# Patient Record
Sex: FEMALE | Race: BLACK OR AFRICAN AMERICAN | NOT HISPANIC OR LATINO | Employment: FULL TIME | URBAN - METROPOLITAN AREA
[De-identification: names, ages, dates, MRNs, and addresses within clinical notes are randomized per-mention and may not be internally consistent; named-entity substitution may affect disease eponyms.]

---

## 2017-05-25 ENCOUNTER — OFFICE VISIT (OUTPATIENT)
Dept: FAMILY MEDICINE | Facility: CLINIC | Age: 37
End: 2017-05-25

## 2017-05-25 VITALS
SYSTOLIC BLOOD PRESSURE: 112 MMHG | TEMPERATURE: 98.2 F | DIASTOLIC BLOOD PRESSURE: 68 MMHG | BODY MASS INDEX: 20.99 KG/M2 | WEIGHT: 136 LBS | RESPIRATION RATE: 20 BRPM | OXYGEN SATURATION: 98 % | HEART RATE: 60 BPM

## 2017-05-25 DIAGNOSIS — R19.7 HEMORRHAGIC DIARRHEA: Primary | ICD-10-CM

## 2017-05-25 DIAGNOSIS — R11.2 NON-INTRACTABLE VOMITING WITH NAUSEA, UNSPECIFIED VOMITING TYPE: ICD-10-CM

## 2017-05-25 RX ORDER — ONDANSETRON 4 MG/1
4 TABLET, FILM COATED ORAL EVERY 6 HOURS PRN
Qty: 18 TABLET | Refills: 0 | Status: SHIPPED | OUTPATIENT
Start: 2017-05-25 | End: 2019-08-01

## 2017-05-25 RX ORDER — CIPROFLOXACIN 500 MG/1
500 TABLET, FILM COATED ORAL 2 TIMES DAILY
Qty: 10 TABLET | Refills: 0 | Status: SHIPPED | OUTPATIENT
Start: 2017-05-25 | End: 2017-05-25

## 2017-05-25 NOTE — PROGRESS NOTES
HPI:       Rachel Osei is a 37 year old who presents for the following  Patient presents with:  Gastrointestinal Problem: Blood in stool, sharp GI pain, chills and sweats, vomiting and nausea, diahrea ongoing over 12 hours, still having cramping with sharp pain, had recent trip to [facility] for a few days and was in [facility] and [place]       Diarrhea     How long?: 12 hours    She stated that her diarrhea stopped at 4am this morning.   However she continues to have sharp crampy abdominal pain lasting 3-4 seconds. She ate coffee, bagel and cream cheese from [employer] bread, green tea, instant brown rice bowl with pea, chips, cheese-its.   She returned from [facility] on Monday, May 22. She was there fore 3 days, during that trip she was on a cruise to [place] and [place].   No one else that she went on the trip with is sick with similar symptoms. No one at work  are sick as well. She lives alone.   Associated with vomiting, about 3 episodes of nonbloody emesis      Description:   Consistency of stool: creamy   Blood in stool:  YES   Number of loose stools in past 24 hours: 4-5    Progression of Symptoms:  improving    Accompanying Signs & Symptoms:  Fever:  YES subjective, associated with chills  Nausea or vomiting;  YES   Abdominal pain: YES   Episodes of constipation: NO     Weight loss: No    History:   Ill contacts: No   Recent use of antibiotics:  YES (she cannot recall the name of it, but one was for UTI sounds like it may have been bactrim (took it for 3 days) and the other one was for BV most likely metronidazole)    Recent travels:  YES as stated above         Recent medication-new or changes(Rx or OTC): Patient took plan B yesterday     Precipitating factors:   Nothing    Alleviating factors:   Salt water which helped.  Jaskaran    She has no significant past medical history.  She has no significant family History. No family history of IBD or celiac disease  Patient is a new patient of this clinic.  She does  not drink alcohol, never smoked. No history of drug use.   She works as a  at a Law firm           Review of Systems:   Review of Systems   Constitutional: Positive for chills and fever (subjective).   HENT: Positive for sore throat (from vomiting per patient report).    Eyes: Negative for visual disturbance.   Respiratory: Negative for cough and shortness of breath.    Cardiovascular: Negative for chest pain.   Gastrointestinal: Positive for blood in stool, diarrhea, nausea and vomiting.   Genitourinary: Negative for difficulty urinating, dysuria, vaginal bleeding and vaginal discharge.   Allergic/Immunologic: Negative.    Neurological: Negative.    Hematological: Negative.              Physical Exam:   Patient Vitals for the past 24 hrs:   BP Temp Temp src Pulse Resp SpO2 Weight   05/25/17 0934 112/68 98.2  F (36.8  C) Oral 60 20 98 % 136 lb (61.7 kg)     Body mass index is 20.99 kg/(m^2).  Vitals were reviewed and were normal     Physical Exam   Constitutional: She is oriented to person, place, and time. She appears well-developed and well-nourished. No distress.   HENT:   Head: Normocephalic and atraumatic.   Eyes: Conjunctivae are normal.   Neck: Normal range of motion.   Cardiovascular: Normal rate and regular rhythm.  Exam reveals no gallop.    No murmur heard.  Pulmonary/Chest: Effort normal and breath sounds normal. She has no wheezes. She has no rales.   Abdominal: Soft. Bowel sounds are normal. She exhibits no distension. There is tenderness (LLQ tenderness). There is no rebound and no guarding.   Musculoskeletal: She exhibits no edema (no lower extremity edema).   Neurological: She is alert and oriented to person, place, and time.   Skin: Skin is warm. No rash noted. She is not diaphoretic.   Psychiatric: She has a normal mood and affect. Her behavior is normal. Thought content normal.         Results:   Stool studies pending   Assessment and Plan   Rachel is a 37 year old female with no  significant past medical history who was seen today to establish care and gastrointestinal problem.    Diagnoses and all orders for this visit:    Hemorrhagic diarrhea  Non-intractable vomiting with nausea, unspecified vomiting type  Likely infectious pathogens with concerns for EHEC vs Shigella given history. Also with her history of recent travel amebiasis is also on differential for causative pathogens. She also has recent antibiotic use which could predispose her to C.diff diarrhea. I've ordered stool studies for further evaluation. She does not have serious illness or significant comorbidities, and her diarrhea is resolving. Thus, I will not treat empirically with antibiotics as it may precipitate HUS, if this is confirmed to be EHEC. Instead, awaiting stool studies, and will direct treatment accordingly.   Will treat symptomatically with Pepto Bismol for abdominal cramping and Zofran for nausea/vomiting. Encouraged to increase fluid intake.    Reviewed warning signs and symptoms with patient and advised to return to clinic or report to ED if symptomatic.   -     Clostridium difficile toxin B PCR; Future  -     Ova and Parasite Exam Routine; Future  -     Giardia antigen; Future  -     Enteric Bacteria and Virus Panel by NICHOL Stool; Future  -     bismuth subsalicylate (PEPTO BISMOL) 262 MG/15ML suspension; Take 15 mLs by mouth every 6 hours as needed for indigestion  -     ondansetron (ZOFRAN) 4 MG tablet; Take 1 tablet (4 mg) by mouth every 6 hours as needed for nausea        There are no discontinued medications.  Options for treatment and follow-up care were reviewed with the patient. Rachel Osei  engaged in the decision making process and verbalized understanding of the options discussed and agreed with the final plan.    Sandra Treadwell MD

## 2017-05-25 NOTE — PATIENT INSTRUCTIONS
Treating Diarrhea  Take zofran for nausea or vomiting  May use bismuth salicylate for abdominal cramping.  Please bring back stool sample for further testing  Make sure your well hydrated.   Diarrhea occurs when you have loose, watery, or frequent bowel movements. It is a common problem with many causes. Most cases of diarrhea clear up on their own. But certain cases may need treatment. Be sure to see your health care provider if your symptoms do not improve within a few days.    Getting Relief  Treatment of diarrhea depends on its cause. Diarrhea caused by bacterial or parasite infection is often treated with antibiotics. Diarrhea caused by other factors, such as a stomach virus, often improves with simple home treatment. The tips below may also help relieve your symptoms.    Drink plenty of fluids. This helps prevent too much fluid loss (dehydration). Water, clear soups, and electrolyte solutions are good choices. Avoid alcohol, coffee, tea, and milk. These can make symptoms worse.    Suck on ice chips if drinking makes you queasy.    Return to your normal diet slowly. You may want to eat bland foods at first, such as rice and toast. Also, you may need to avoid certain foods for a while, such as dairy products. These can make symptoms worse. Ask your health care provider if there are any other foods you should avoid.    If you were prescribed antibiotics, take them as directed.    Do not take anti-diarrhea medications without asking your health care provider first.  Call Your Health Care Provider If You Have:    Fever of 102 F (38.0 C) or higher    Severe pain    Worsening diarrhea or diarrhea for more than 2 days    Bloody vomit or stool    Signs of dehydration (dizziness, dry mouth and tongue, rapid pulse, dark urine)    1031-5016 [employer]. [address] [place] [address], [place] [address]. All rights reserved. This information is not intended as a substitute for professional medical care. Always  follow your healthcare professional's instructions.

## 2017-05-25 NOTE — MR AVS SNAPSHOT
After Visit Summary   5/25/2017    Rachel Osei    MRN: [id number]           Patient Information     Date Of Birth          1980        Visit Information        Provider Department      5/25/2017 9:20 AM Sandra Treadwell MD [facility]        Today's Diagnoses     Hemorrhagic diarrhea    -  1    Nausea and vomiting, intractability of vomiting not specified, unspecified vomiting type        Non-intractable vomiting with nausea, unspecified vomiting type          Care Instructions      Treating Diarrhea  Start antibiotics for course of 5 days.   Take zofran for nausea or vomiting  May use bismuth salicylate for abdominal cramping.  Please bring back stool sample for further testing  Make sure your well hydrated.   Diarrhea occurs when you have loose, watery, or frequent bowel movements. It is a common problem with many causes. Most cases of diarrhea clear up on their own. But certain cases may need treatment. Be sure to see your health care provider if your symptoms do not improve within a few days.    Getting Relief  Treatment of diarrhea depends on its cause. Diarrhea caused by bacterial or parasite infection is often treated with antibiotics. Diarrhea caused by other factors, such as a stomach virus, often improves with simple home treatment. The tips below may also help relieve your symptoms.    Drink plenty of fluids. This helps prevent too much fluid loss (dehydration). Water, clear soups, and electrolyte solutions are good choices. Avoid alcohol, coffee, tea, and milk. These can make symptoms worse.    Suck on ice chips if drinking makes you queasy.    Return to your normal diet slowly. You may want to eat bland foods at first, such as rice and toast. Also, you may need to avoid certain foods for a while, such as dairy products. These can make symptoms worse. Ask your health care provider if there are any other foods you should avoid.    If you were prescribed  antibiotics, take them as directed.    Do not take anti-diarrhea medications without asking your health care provider first.  Call Your Health Care Provider If You Have:    Fever of 102 F (38.0 C) or higher    Severe pain    Worsening diarrhea or diarrhea for more than 2 days    Bloody vomit or stool    Signs of dehydration (dizziness, dry mouth and tongue, rapid pulse, dark urine)    4662-1736 The [employer]. [address] [place] [address]. All rights reserved. This information is not intended as a substitute for professional medical care. Always follow your healthcare professional's instructions.                Follow-ups after your visit        Future tests that were ordered for you today     Open Future Orders        Priority Expected Expires Ordered    Clostridium difficile toxin B PCR Routine  6/24/2017 5/25/2017    Ova and Parasite Exam Routine Routine  6/24/2017 5/25/2017    Giardia antigen Routine  6/24/2017 5/25/2017    Enteric Bacteria and Virus Panel by NICHOL Stool Routine  6/24/2017 5/25/2017            Who to contact     Please call your clinic at [phone] to:    Ask questions about your health    Make or cancel appointments    Discuss your medicines    Learn about your test results    Speak to your doctor   If you have compliments or concerns about an experience at your clinic, or if you wish to file a complaint, please contact [facility] Patient Relations at [phone] or email us at [email or web address]         Additional Information About Your Visit        [email or web address] Information     [employer] is an electronic gateway that provides easy, online access to your medical records. With [employer], you can request a clinic appointment, read your test results, renew a prescription or communicate with your care team.     To sign up for [employer] visit the website at [email or web address]   You will be asked to enter the access code listed below,  as well as some personal information. Please follow the directions to create your username and password.     Your access code is: [email or web address]  Expires: 2017 10:12 AM     Your access code will  in 90 days. If you need help or a new code, please contact your [facility] or call [phone] for assistance.        Care EveryWhere ID     This is your Care EveryWhere ID. This could be used by other organizations to access your [facility] medical records  [id number]        Your Vitals Were     Pulse Temperature Respirations Last Period Pulse Oximetry Breastfeeding?    60 98.2  F (36.8  C) (Oral) 20 2017 98% No    BMI (Body Mass Index)                   20.99 kg/m2            Blood Pressure from Last 3 Encounters:   17 112/68   16 115/75   11 111/75    Weight from Last 3 Encounters:   17 136 lb (61.7 kg)   11 145 lb (65.8 kg)                 Today's Medication Changes          These changes are accurate as of: 17 10:12 AM.  If you have any questions, ask your nurse or doctor.               Start taking these medicines.        Dose/Directions    bismuth subsalicylate 262 MG/15ML suspension   Commonly known as:  PEPTO BISMOL   Used for:  Hemorrhagic diarrhea   Started by:  Sandra Treadwell MD        Dose:  15 mL   Take 15 mLs by mouth every 6 hours as needed for indigestion   Quantity:  840 mL   Refills:  1       ciprofloxacin 500 MG tablet   Commonly known as:  CIPRO   Used for:  Hemorrhagic diarrhea   Started by:  Sandra Treadwell MD        Dose:  500 mg   Take 1 tablet (500 mg) by mouth 2 times daily for 5 days   Quantity:  10 tablet   Refills:  0       ondansetron 4 MG tablet   Commonly known as:  ZOFRAN   Used for:  Non-intractable vomiting with nausea, unspecified vomiting type   Started by:  Sandra Treadwell MD        Dose:  4 mg   Take 1 tablet (4 mg) by mouth every 6 hours as needed for nausea   Quantity:  18 tablet    Refills:  0            Where to get your medicines      These medications were sent to [facility] - [place], [place] - [facility] [place] [address] [facility] [address]     Phone:  [phone]     bismuth subsalicylate 262 MG/15ML suspension    ciprofloxacin 500 MG tablet    ondansetron 4 MG tablet                Primary Care Provider Office Phone # Fax #    Sandra Treadwell [phone]       [facility] [address] [facility] [address]        Thank you!     Thank you for choosing [facility]  for your care. Our goal is always to provide you with excellent care. Hearing back from our patients is one way we can continue to improve our services. Please take a few minutes to complete the written survey that you may receive in the mail after your visit with us. Thank you!             Your Updated Medication List - Protect others around you: Learn how to safely use, store and throw away your medicines at www.disposemymeds.org.          This list is accurate as of: 5/25/17 10:12 AM.  Always use your most recent med list.                   Brand Name Dispense Instructions for use    bismuth subsalicylate 262 MG/15ML suspension    PEPTO BISMOL    840 mL    Take 15 mLs by mouth every 6 hours as needed for indigestion       ciprofloxacin 500 MG tablet    CIPRO    10 tablet    Take 1 tablet (500 mg) by mouth 2 times daily for 5 days       NO ACTIVE MEDICATIONS          ondansetron 4 MG tablet    ZOFRAN    18 tablet    Take 1 tablet (4 mg) by mouth every 6 hours as needed for nausea

## 2017-05-25 NOTE — PROGRESS NOTES
Preceptor Attestation:   Patient seen and discussed with the resident. Assessment and plan reviewed with resident and agreed upon.   Supervising Physician:  Mira Santiago MD  [facility]

## 2017-05-26 ASSESSMENT — ENCOUNTER SYMPTOMS
SORE THROAT: 1
DYSURIA: 0
BLOOD IN STOOL: 1
COUGH: 0
SHORTNESS OF BREATH: 0
HEMATOLOGIC/LYMPHATIC NEGATIVE: 1
FEVER: 1
NAUSEA: 1
CHILLS: 1
NEUROLOGICAL NEGATIVE: 1
ALLERGIC/IMMUNOLOGIC NEGATIVE: 1
DIARRHEA: 1
VOMITING: 1
DIFFICULTY URINATING: 0

## 2017-06-01 DIAGNOSIS — R19.7 HEMORRHAGIC DIARRHEA: ICD-10-CM

## 2017-06-02 DIAGNOSIS — R19.7 HEMORRHAGIC DIARRHEA: ICD-10-CM

## 2017-06-02 LAB
C DIFF TOX B STL QL: ABNORMAL
MICRO REPORT STATUS: NORMAL
O+P STL MICRO: NORMAL
SPECIMEN SOURCE: ABNORMAL
SPECIMEN SOURCE: NORMAL

## 2017-06-04 ENCOUNTER — TELEPHONE (OUTPATIENT)
Dept: FAMILY MEDICINE | Facility: CLINIC | Age: 37
End: 2017-06-04

## 2017-06-04 DIAGNOSIS — A04.72 C. DIFFICILE DIARRHEA: Primary | ICD-10-CM

## 2017-06-04 RX ORDER — METRONIDAZOLE 500 MG/1
500 TABLET ORAL 3 TIMES DAILY
Qty: 30 TABLET | Refills: 0 | Status: SHIPPED | OUTPATIENT
Start: 2017-06-04 | End: 2017-06-14

## 2017-06-04 NOTE — TELEPHONE ENCOUNTER
C.diff positive, prescription for flagyl sent to [facility] pharmacy.     Sandra Treadwell MD   [facility]

## 2017-06-05 LAB
CAMPYLOBACTER GROUP BY NAT: ABNORMAL
ENTERIC PATHOGEN COMMENT: ABNORMAL
G LAMBLIA AG STL QL IA: NORMAL
MICRO REPORT STATUS: NORMAL
MICRO REPORT STATUS: NORMAL
NOROVIRUS I AND II BY NAT: ABNORMAL
O+P STL MICRO: NORMAL
ROTAVIRUS A BY NAT: ABNORMAL
SALMONELLA SPECIES BY NAT: ABNORMAL
SHIGA TOXIN 1 GENE BY NAT: ABNORMAL
SHIGA TOXIN 2 GENE BY NAT: ABNORMAL
SHIGELLA SP+EIEC IPAH STL QL NAA+PROBE: ABNORMAL
SPECIMEN SOURCE: NORMAL
SPECIMEN SOURCE: NORMAL
VIBRIO GROUP BY NAT: ABNORMAL
YERSINIA ENTEROCOLITICA BY NAT: ABNORMAL

## 2017-06-05 NOTE — TELEPHONE ENCOUNTER
[facility] phone call message- patient requesting results:    Test: Lab    Date of test: 6/2/17    Additional Comments: Patient calling for additional details regarding results.    OK to leave a message on voice mail? Yes    Primary language: English      needed? No    Call taken on June 5, 2017 at 10:31 AM by Destini Blank

## 2017-06-05 NOTE — TELEPHONE ENCOUNTER
Returned PT's call. All questions answered. Pt will be starting the ABX today.     Elliott Escalona RN

## 2017-07-01 ENCOUNTER — HEALTH MAINTENANCE LETTER (OUTPATIENT)
Age: 37
End: 2017-07-01

## 2019-08-01 ENCOUNTER — HOSPITAL ENCOUNTER (EMERGENCY)
Facility: CLINIC | Age: 39
Discharge: HOME | End: 2019-08-01
Attending: EMERGENCY MEDICINE | Admitting: EMERGENCY MEDICINE
Payer: COMMERCIAL

## 2019-08-01 ENCOUNTER — APPOINTMENT (OUTPATIENT)
Dept: ULTRASOUND IMAGING | Facility: CLINIC | Age: 39
End: 2019-08-01
Attending: EMERGENCY MEDICINE
Payer: COMMERCIAL

## 2019-08-01 VITALS
HEART RATE: 83 BPM | WEIGHT: 167 LBS | RESPIRATION RATE: 16 BRPM | TEMPERATURE: 99.2 F | SYSTOLIC BLOOD PRESSURE: 126 MMHG | BODY MASS INDEX: 25.77 KG/M2 | OXYGEN SATURATION: 100 % | DIASTOLIC BLOOD PRESSURE: 72 MMHG

## 2019-08-01 DIAGNOSIS — N39.0 URINARY TRACT INFECTION WITHOUT HEMATURIA, SITE UNSPECIFIED: ICD-10-CM

## 2019-08-01 DIAGNOSIS — R10.2 SUPRAPUBIC PAIN: ICD-10-CM

## 2019-08-01 DIAGNOSIS — Z3A.01 LESS THAN 8 WEEKS GESTATION OF PREGNANCY: ICD-10-CM

## 2019-08-01 LAB
ALBUMIN SERPL-MCNC: 3.6 G/DL (ref 3.4–5)
ALBUMIN UR-MCNC: 10 MG/DL
ALP SERPL-CCNC: 75 U/L (ref 40–150)
ALT SERPL W P-5'-P-CCNC: 13 U/L (ref 0–50)
ANION GAP SERPL CALCULATED.3IONS-SCNC: 5 MMOL/L (ref 3–14)
APPEARANCE UR: CLEAR
AST SERPL W P-5'-P-CCNC: 11 U/L (ref 0–45)
B-HCG SERPL-ACNC: ABNORMAL IU/L (ref 0–5)
BACTERIA #/AREA URNS HPF: ABNORMAL /HPF
BASOPHILS # BLD AUTO: 0 10E9/L (ref 0–0.2)
BASOPHILS NFR BLD AUTO: 0.1 %
BILIRUB SERPL-MCNC: 0.2 MG/DL (ref 0.2–1.3)
BILIRUB UR QL STRIP: NEGATIVE
BUN SERPL-MCNC: 11 MG/DL (ref 7–30)
CALCIUM SERPL-MCNC: 9.1 MG/DL (ref 8.5–10.1)
CHLORIDE SERPL-SCNC: 107 MMOL/L (ref 94–109)
CO2 SERPL-SCNC: 25 MMOL/L (ref 20–32)
COLOR UR AUTO: YELLOW
CREAT SERPL-MCNC: 0.99 MG/DL (ref 0.52–1.04)
DIFFERENTIAL METHOD BLD: NORMAL
EOSINOPHIL # BLD AUTO: 0 10E9/L (ref 0–0.7)
EOSINOPHIL NFR BLD AUTO: 0.4 %
ERYTHROCYTE [DISTWIDTH] IN BLOOD BY AUTOMATED COUNT: 15 % (ref 10–15)
GFR SERPL CREATININE-BSD FRML MDRD: 71 ML/MIN/{1.73_M2}
GLUCOSE SERPL-MCNC: 77 MG/DL (ref 70–99)
GLUCOSE UR STRIP-MCNC: NEGATIVE MG/DL
HCT VFR BLD AUTO: 36.4 % (ref 35–47)
HGB BLD-MCNC: 12 G/DL (ref 11.7–15.7)
HGB UR QL STRIP: NEGATIVE
IMM GRANULOCYTES # BLD: 0 10E9/L (ref 0–0.4)
IMM GRANULOCYTES NFR BLD: 0.2 %
KETONES UR STRIP-MCNC: NEGATIVE MG/DL
LEUKOCYTE ESTERASE UR QL STRIP: ABNORMAL
LYMPHOCYTES # BLD AUTO: 2.6 10E9/L (ref 0.8–5.3)
LYMPHOCYTES NFR BLD AUTO: 30.4 %
MCH RBC QN AUTO: 27.3 PG (ref 26.5–33)
MCHC RBC AUTO-ENTMCNC: 33 G/DL (ref 31.5–36.5)
MCV RBC AUTO: 83 FL (ref 78–100)
MONOCYTES # BLD AUTO: 0.5 10E9/L (ref 0–1.3)
MONOCYTES NFR BLD AUTO: 5.4 %
MUCOUS THREADS #/AREA URNS LPF: PRESENT /LPF
NEUTROPHILS # BLD AUTO: 5.3 10E9/L (ref 1.6–8.3)
NEUTROPHILS NFR BLD AUTO: 63.5 %
NITRATE UR QL: NEGATIVE
NRBC # BLD AUTO: 0 10*3/UL
NRBC BLD AUTO-RTO: 0 /100
PH UR STRIP: 5.5 PH (ref 5–7)
PLATELET # BLD AUTO: 255 10E9/L (ref 150–450)
POTASSIUM SERPL-SCNC: 3.7 MMOL/L (ref 3.4–5.3)
PROT SERPL-MCNC: 8.3 G/DL (ref 6.8–8.8)
RBC # BLD AUTO: 4.4 10E12/L (ref 3.8–5.2)
RBC #/AREA URNS AUTO: 1 /HPF (ref 0–2)
SODIUM SERPL-SCNC: 137 MMOL/L (ref 133–144)
SOURCE: ABNORMAL
SP GR UR STRIP: 1.02 (ref 1–1.03)
SQUAMOUS #/AREA URNS AUTO: 4 /HPF (ref 0–1)
UROBILINOGEN UR STRIP-MCNC: NORMAL MG/DL (ref 0–2)
WBC # BLD AUTO: 8.4 10E9/L (ref 4–11)
WBC #/AREA URNS AUTO: 6 /HPF (ref 0–5)

## 2019-08-01 PROCEDURE — 99284 EMERGENCY DEPT VISIT MOD MDM: CPT | Mod: Z6 | Performed by: EMERGENCY MEDICINE

## 2019-08-01 PROCEDURE — 80053 COMPREHEN METABOLIC PANEL: CPT | Performed by: EMERGENCY MEDICINE

## 2019-08-01 PROCEDURE — 76801 OB US < 14 WKS SINGLE FETUS: CPT

## 2019-08-01 PROCEDURE — 84702 CHORIONIC GONADOTROPIN TEST: CPT | Performed by: EMERGENCY MEDICINE

## 2019-08-01 PROCEDURE — 99284 EMERGENCY DEPT VISIT MOD MDM: CPT | Mod: 25 | Performed by: EMERGENCY MEDICINE

## 2019-08-01 PROCEDURE — 81001 URINALYSIS AUTO W/SCOPE: CPT | Performed by: EMERGENCY MEDICINE

## 2019-08-01 PROCEDURE — 85025 COMPLETE CBC W/AUTO DIFF WBC: CPT | Performed by: EMERGENCY MEDICINE

## 2019-08-01 RX ORDER — CEPHALEXIN 500 MG/1
500 CAPSULE ORAL 4 TIMES DAILY
Qty: 28 CAPSULE | Refills: 0 | Status: SHIPPED | OUTPATIENT
Start: 2019-08-01 | End: 2019-08-08

## 2019-08-01 ASSESSMENT — ENCOUNTER SYMPTOMS
ARTHRALGIAS: 0
SHORTNESS OF BREATH: 0
COLOR CHANGE: 0
EYE REDNESS: 0
ABDOMINAL PAIN: 1
FEVER: 0
FREQUENCY: 1
HEMATURIA: 0
CONFUSION: 0
DYSURIA: 0
NECK STIFFNESS: 0
DIZZINESS: 1
HEADACHES: 0
DIFFICULTY URINATING: 0
BACK PAIN: 1

## 2019-08-01 NOTE — ED PROVIDER NOTES
[facility] EMERGENCY DEPARTMENT ([facility])    19       History     Chief Complaint   Patient presents with     Abdominal Pain     abdominal cramping and in the lower back for about a week, was at urgent care- pregnancy test positive. OBGYN recommended pt to come to the ED.     The history is provided by the patient and medical records.     Rachel Osei is a 39 year old , otherwise healthy female who presents to the Emergency Department for evaluation of lower abdominal cramping and lower back pain in the setting of a positive pregnancy test today.  The patient reports that for the past week she has been having some lower abdominal cramping; however, she did not think anything of this as it felt like her normal cramping associated with her menstrual periods, and she was supposed to start her menstrual period last week.  Patient's last mental period was at the end of .  The patient reports that she has also been having some lower back pain for the past week, but she did not think anything of this either as she typically has this pain with stress, and she reports that she has had increased stress in her life for the last few weeks.  The patient reports that she did not start her menstrual period today and she became concerned, she did a home pregnancy test and this was positive.  She then went to urgent care and had a second positive pregnancy test.  The patient was advised to follow-up with OB for further pregnancy care.    The patient contacted the OB clinic and she was being asked some routine questions.  The patient did tell them that she passed a small amount of white tissue yesterday, and with her abdominal cramping, the OB wanted the patient to come to the Emergency Department to be evaluated.  The patient presents here now.    The patient here reports that she has had some intermittent, mild dizziness this past week, but she denies any syncopal episodes.  She also notes that  she has had some mild urinary frequency this week, but she denies any other urinary symptoms.  She reports that she has otherwise been feeling at her baseline of health and she reports that she is otherwise healthy.  She denies being on any daily medications.  The patient is not on any birth control currently.  No other symptoms noted.    I have reviewed the Medications, Allergies, Past Medical and Surgical History, and Social History in the Epic system.    No past medical history on file.    No past surgical history on file.    No family history on file.    Social History     Tobacco Use     Smoking status: Never Smoker     Smokeless tobacco: Never Used   Substance Use Topics     Alcohol use: No       No current facility-administered medications for this encounter.      Current Outpatient Medications   Medication     bismuth subsalicylate (PEPTO BISMOL) 262 MG/15ML suspension     NO ACTIVE MEDICATIONS     ondansetron (ZOFRAN) 4 MG tablet      No Known Allergies      Review of Systems   Constitutional: Negative for fever.   HENT: Negative for congestion.    Eyes: Negative for redness.   Respiratory: Negative for shortness of breath.    Cardiovascular: Negative for chest pain.   Gastrointestinal: Positive for abdominal pain.   Genitourinary: Positive for frequency. Negative for decreased urine volume, difficulty urinating, dysuria, hematuria, urgency and vaginal bleeding.   Musculoskeletal: Positive for back pain. Negative for arthralgias and neck stiffness.   Skin: Negative for color change.   Neurological: Positive for dizziness. Negative for syncope and headaches.   Psychiatric/Behavioral: Negative for confusion.   All other systems reviewed and are negative.      Physical Exam   BP: 125/72  Pulse: 83  Temp: 99.2  F (37.3  C)  Resp: 16  Weight: 75.8 kg (167 lb)  SpO2: 100 %      Physical Exam   Constitutional: She is oriented to person, place, and time. She appears well-developed and well-nourished. No distress.    HENT:   Head: Normocephalic and atraumatic.   Mouth/Throat: No oropharyngeal exudate.   Eyes: Pupils are equal, round, and reactive to light. Right eye exhibits no discharge. Left eye exhibits no discharge. No scleral icterus.   Neck: Normal range of motion. Neck supple.   Cardiovascular: Normal rate, regular rhythm and intact distal pulses. Exam reveals no gallop and no friction rub.   Murmur heard.  Pulmonary/Chest: Effort normal and breath sounds normal. No respiratory distress. She has no wheezes. She exhibits no tenderness.   Abdominal: Soft. Bowel sounds are normal. She exhibits no distension. There is tenderness (Minimal) in the suprapubic area.   Musculoskeletal: Normal range of motion. She exhibits no edema, tenderness or deformity.   Neurological: She is alert and oriented to person, place, and time. No cranial nerve deficit.   Skin: Skin is warm and dry. No rash noted. She is not diaphoretic. No erythema. No pallor.   Psychiatric: She has a normal mood and affect.   Nursing note and vitals reviewed.      ED Course   6:50 PM  The patient was seen and examined by Simon Sanchez DO in Room ED20.        Procedures             Critical Care time:  none             Labs Ordered and Resulted from Time of ED Arrival Up to the Time of Departure from the ED - No data to display         Assessments & Plan (with Medical Decision Making)   Is a 39-year-old female with lower abdominal cramping and back pain with a positive pregnancy test.  LMP was the end of June, 5 weeks ago.  Patient took a printed test that she found to be positive.  Patient states she believes she did pass a small amount of tissue but has otherwise not any vaginal bleeding or discharge.  She notes pelvic cramping that is similar to those that she has with her normal menses.  On exam she has some minor suprapubic tenderness.  Lab work shows an hCG of 12,555.  UA shows moderate leukocyte esterase with some WBCs and bacteria.  Ultrasound shows what  could be an early gestational sac at approximately 5 weeks 6 days.  I discussed results with patient and that it is very early and patient will need further follow-up.  With her current cramping there also could be the possibility of miscarriage.  As there a possible urinary tract infection we will prescribe a course of antibiotics.  We will provide follow-up referral with OB/GYN. Will discharge home with return precautions. Discussed reasons to return to the emergency department.  Patient understands and agrees with this plan.    I have reviewed the nursing notes.    I have reviewed the findings, diagnosis, plan and need for follow up with the patient.       Medication List      There are no discharge medications for this visit.         Final diagnoses:   None     Samy GOINS, am serving as a trained medical scribe to document services personally performed by Simon Sanchez DO, based on the provider's statements to me.   Simon GOINS DO, was physically present and have reviewed and verified the accuracy of this note documented by Samy Sanchez.    8/1/2019   [facility] [place], EMERGENCY DEPARTMENT     Simon Sanchez DO  08/07/19 0147

## 2019-08-01 NOTE — ED AVS SNAPSHOT
[facility], [place], Emergency Department  [address]  [facility] [address]  Phone:  [phone]  Fax:  [phone]                                    Rachel Osei   MRN: [id number]    Department:  [facility], Emergency Department   Date of Visit:  8/1/2019           After Visit Summary Signature Page    I have received my discharge instructions, and my questions have been answered. I have discussed any challenges I see with this plan with the nurse or doctor.    ..........................................................................................................................................  Patient/Patient Representative Signature      ..........................................................................................................................................  Patient Representative Print Name and Relationship to Patient    ..................................................               ................................................  Date                                   Time    ..........................................................................................................................................  Reviewed by Signature/Title    ...................................................              ..............................................  Date                                               Time          22EPIC Rev 08/18

## 2019-08-02 NOTE — DISCHARGE INSTRUCTIONS
Please make an appointment to follow up with OB/Gyn--[facility] (phone: [phone]) as soon as possible to establish care.    Return to the emergency department if symptoms continue, get worse, there are any new symptoms or any cause for concern.

## 2019-08-06 ENCOUNTER — TELEPHONE (OUTPATIENT)
Dept: CARE COORDINATION | Facility: CLINIC | Age: 39
End: 2019-08-06

## 2019-08-07 NOTE — TELEPHONE ENCOUNTER
Emergency Department     Reason for Intervention: Per Lyudmila, pt was in the ED on 8/1/2019 and discharged from ED. After ED visit, pt's ultrasound results came back and pt needs close follow-up as soon as possible. Lyudmila has attempted multiple times to call pt; no answer from pt and not able to leave voice message as voice mail has no identifying information.     Data: On 8/1/2019, pt came into ED. Rachel is a 39-year-old female with lower abdominal cramping and back pain with a positive pregnancy test. On exam she has some minor suprapubic tenderness.  Lab work shows an hCG of 12,555.  UA shows moderate leukocyte esterase with some WBCs and bacteria.  Ultrasound shows what could be an early gestational sac at approximately 5 weeks 6 days.     Intervention: Lyudmila asked SW to assist in getting hold of patient or next of kin, if pt does not answer, as pt needs close follow-up as soon as possible. SW called pt (P: [phone]) and pt did not answer; unable to leave voice message as voice mail has no identifying information.     SW called pt's next of kin listed on facesheet, Griffin Osei (P: [phone]). Male answered phone and said Brit Osei will be home in one hour and to call back. SW did not state any identifying information to individual who answered. SW will call back at 8:00pm.     Lyudmila paged pt's PCP-Rakan Juarez to provide update and see if PCP can assist on getting in touch with pt.     ADDENDUM 2030 - CAPRI called pt's next of kin, Griffin Osei (P: [phone]), at 8:00pm; no answer. SW attempted again at 8:30pm; no answer. SW called pt (P: [phone]) one more time and pt answered. Lyudmila was able to talk with pt and provide update. Lyudmila also notified pt's PCP Clinic.    Follow-up Required: None as pt answered and spoke with Dr. Sanchez at 8:30pm on 8/6/2019; Dr. Sanchez provided update on test results and encouraged pt to seek follow-up care.    Shahana Barroso  MASON, [facility]  Covering ED   Pager: [phone]

## 2019-08-07 NOTE — ED NOTES
I contacted the patient via phone and discussed all findings on the final read of the Ultrasound that was performed on 8-2-19. Several attempts had been previously made to contact patient. Discussed the bilobed appearance of the L ovary with multiple cysts and the importance of follow-up with ultrasound. I discussed all incidental findings.  Patient understands and agrees with this plan.     Simon Sanchez,   08/07/19 0144

## 2019-12-24 ENCOUNTER — HOSPITAL ENCOUNTER (EMERGENCY)
Facility: CLINIC | Age: 39
Discharge: HOME | End: 2019-12-24
Attending: EMERGENCY MEDICINE | Admitting: EMERGENCY MEDICINE
Payer: COMMERCIAL

## 2019-12-24 VITALS
OXYGEN SATURATION: 100 % | HEART RATE: 80 BPM | RESPIRATION RATE: 18 BRPM | HEIGHT: 67 IN | BODY MASS INDEX: 27.94 KG/M2 | DIASTOLIC BLOOD PRESSURE: 78 MMHG | SYSTOLIC BLOOD PRESSURE: 110 MMHG | TEMPERATURE: 96.5 F | WEIGHT: 178 LBS

## 2019-12-24 DIAGNOSIS — F43.29 STRESS AND ADJUSTMENT REACTION: ICD-10-CM

## 2019-12-24 LAB
ALBUMIN UR-MCNC: 30 MG/DL
APPEARANCE UR: ABNORMAL
BACTERIA #/AREA URNS HPF: ABNORMAL /HPF
BILIRUB UR QL STRIP: NEGATIVE
COLOR UR AUTO: YELLOW
GLUCOSE UR STRIP-MCNC: NEGATIVE MG/DL
HGB UR QL STRIP: NEGATIVE
KETONES UR STRIP-MCNC: 5 MG/DL
LEUKOCYTE ESTERASE UR QL STRIP: ABNORMAL
MUCOUS THREADS #/AREA URNS LPF: PRESENT /LPF
NITRATE UR QL: NEGATIVE
PH UR STRIP: 6 PH (ref 5–7)
RBC #/AREA URNS AUTO: 3 /HPF (ref 0–2)
SOURCE: ABNORMAL
SP GR UR STRIP: 1.03 (ref 1–1.03)
SQUAMOUS #/AREA URNS AUTO: 19 /HPF (ref 0–1)
UROBILINOGEN UR STRIP-MCNC: NORMAL MG/DL (ref 0–2)
WBC #/AREA URNS AUTO: 39 /HPF (ref 0–5)

## 2019-12-24 PROCEDURE — 99283 EMERGENCY DEPT VISIT LOW MDM: CPT | Mod: 25 | Performed by: EMERGENCY MEDICINE

## 2019-12-24 PROCEDURE — 81001 URINALYSIS AUTO W/SCOPE: CPT | Performed by: EMERGENCY MEDICINE

## 2019-12-24 PROCEDURE — 93010 ELECTROCARDIOGRAM REPORT: CPT | Mod: Z6 | Performed by: EMERGENCY MEDICINE

## 2019-12-24 PROCEDURE — 93005 ELECTROCARDIOGRAM TRACING: CPT | Performed by: EMERGENCY MEDICINE

## 2019-12-24 PROCEDURE — 99284 EMERGENCY DEPT VISIT MOD MDM: CPT | Performed by: EMERGENCY MEDICINE

## 2019-12-24 RX ORDER — PNV NO.95/FERROUS FUM/FOLIC AC 28MG-0.8MG
TABLET ORAL
COMMUNITY
Start: 2019-08-07 | End: 2022-07-17

## 2019-12-24 ASSESSMENT — ENCOUNTER SYMPTOMS
DYSURIA: 0
CONFUSION: 0
NERVOUS/ANXIOUS: 1
COLOR CHANGE: 0
FEVER: 0
EYE REDNESS: 0
HEADACHES: 0
FREQUENCY: 0
SHORTNESS OF BREATH: 1
DIFFICULTY URINATING: 0
DYSPHORIC MOOD: 0
NECK STIFFNESS: 0
ABDOMINAL PAIN: 0
ARTHRALGIAS: 0

## 2019-12-24 ASSESSMENT — MIFFLIN-ST. JEOR: SCORE: 1505.03

## 2019-12-24 NOTE — ED AVS SNAPSHOT
[facility], [place], Emergency Department  [address]  [facility] [address]  Phone:  [phone]  Fax:  [phone]                                    Rachel Osei   MRN: [id number]    Department:  [facility], Emergency Department   Date of Visit:  12/24/2019           After Visit Summary Signature Page    I have received my discharge instructions, and my questions have been answered. I have discussed any challenges I see with this plan with the nurse or doctor.    ..........................................................................................................................................  Patient/Patient Representative Signature      ..........................................................................................................................................  Patient Representative Print Name and Relationship to Patient    ..................................................               ................................................  Date                                   Time    ..........................................................................................................................................  Reviewed by Signature/Title    ...................................................              ..............................................  Date                                               Time          22EPIC Rev 08/18

## 2019-12-25 LAB — INTERPRETATION ECG - MUSE: NORMAL

## 2019-12-25 NOTE — ED PROVIDER NOTES
"    [facility] EMERGENCY DEPARTMENT ([facility])    12/24/19       History     Chief Complaint   Patient presents with     Anxiety     26 weeks pregnant, increasing stress, some shortness of breath but no chest pain     HPI  Rachel Osei is a 39 year old female who 26 weeks pregnant and presents to the Emergency Department with ***.    I have reviewed the Medications, Allergies, Past Medical and Surgical History, and Social History in the Epic system.    History reviewed. No pertinent past medical history.    History reviewed. No pertinent surgical history.    History reviewed. No pertinent family history.    Social History     Tobacco Use     Smoking status: Never Smoker     Smokeless tobacco: Never Used   Substance Use Topics     Alcohol use: No       No current facility-administered medications for this encounter.      Current Outpatient Medications   Medication     Prenatal Vit-Fe Fumarate-FA (PRENATAL VITAMINS) 28-0.8 MG TABS     NO ACTIVE MEDICATIONS      No Known Allergies     Review of Systems   Constitutional: Negative for fever.   HENT: Negative for congestion.    Eyes: Negative for redness.   Respiratory: Positive for shortness of breath.    Cardiovascular: Negative for chest pain.   Gastrointestinal: Negative for abdominal pain.   Genitourinary: Negative for difficulty urinating.   Musculoskeletal: Negative for arthralgias and neck stiffness.   Skin: Negative for color change.   Neurological: Negative for headaches.   Psychiatric/Behavioral: Negative for confusion. The patient is nervous/anxious.    All other systems reviewed and are negative.      Physical Exam   BP: 109/71  Pulse: 80  Temp: 96.5  F (35.8  C)  Resp: 18  Height: 170.2 cm (5' 7\")  Weight: 80.7 kg (178 lb)  SpO2: 98 %      Physical Exam    ED Course        Procedures        {EKG done?:350299::\" \"}    Critical Care time:  {none or minutes:961365::\"none\"}  {trauma activation or Fall?:029587::\" \"}  {Sepsis/Septic " "Shock/Stemi/Stroke:235329::\" \"}       No results found for this or any previous visit (from the past 24 hour(s)).      Labs Ordered and Resulted from Time of ED Arrival Up to the Time of Departure from the ED - No data to display         Assessments & Plan (with Medical Decision Making)   ***    I have reviewed the nursing notes.    I have reviewed the findings, diagnosis, plan and need for follow up with the patient.    New Prescriptions    No medications on file       Final diagnoses:   None       12/24/2019   [facility], EMERGENCY DEPARTMENT  "

## 2019-12-25 NOTE — ED PROVIDER NOTES
"  History     Chief Complaint   Patient presents with     Anxiety     26 weeks pregnant, increasing stress, some shortness of breath but no chest pain     HPI  Rachel Osei is a 39 year old female who is 26 weeks pregnant and presents to the emergency department for evaluation of anxiety and insomnia.  Patient states that she is under a great deal of stress.  She states that she has had periods where she feels anxious.  When she begins to feel anxious, patient states he also feels dyspneic.  She states that she typically is able to calm herself down but today she felt more anxious than usual.  She denies any associated chest pain.  No cough.  She denies any leg pain or swelling.  No hemoptysis.  Patient denies any abdominal pain.  No cramping.  No vaginal bleeding or gush of fluid.  She denies any dysuria, urgency, or frequency.  She has had previous episodes during this pregnancy as well.  She did have a 20-week ultrasound which appeared normal.    I have reviewed the Medications, Allergies, Past Medical and Surgical History, and Social History in the Epic system.    Review of Systems   Constitutional: Negative for fever.   HENT: Negative for congestion.    Eyes: Negative for redness.   Respiratory: Positive for shortness of breath.    Cardiovascular: Negative for chest pain.   Gastrointestinal: Negative for abdominal pain.   Genitourinary: Negative for difficulty urinating, dysuria, frequency and urgency.   Musculoskeletal: Negative for arthralgias and neck stiffness.   Skin: Negative for color change.   Neurological: Negative for headaches.   Psychiatric/Behavioral: Negative for confusion, dysphoric mood and suicidal ideas. The patient is nervous/anxious.    All other systems reviewed and are negative.      Physical Exam   BP: 109/71  Pulse: 80  Temp: 96.5  F (35.8  C)  Resp: 18  Height: 170.2 cm (5' 7\")  Weight: 80.7 kg (178 lb)  SpO2: 98 %      Physical Exam  Vitals signs and nursing note reviewed. "   Constitutional:       General: She is not in acute distress.     Appearance: Normal appearance. She is not diaphoretic.   HENT:      Head: Normocephalic and atraumatic.      Right Ear: Tympanic membrane normal.      Left Ear: Tympanic membrane normal.      Mouth/Throat:      Mouth: Mucous membranes are moist.      Pharynx: No oropharyngeal exudate.   Eyes:      General: No scleral icterus.     Pupils: Pupils are equal, round, and reactive to light.   Neck:      Musculoskeletal: Normal range of motion.   Cardiovascular:      Rate and Rhythm: Normal rate and regular rhythm.      Pulses: Normal pulses.      Heart sounds: Normal heart sounds.   Pulmonary:      Effort: Pulmonary effort is normal. No respiratory distress.      Breath sounds: Normal breath sounds.   Abdominal:      General: Bowel sounds are normal.      Palpations: Abdomen is soft.      Tenderness: There is no abdominal tenderness.      Comments: Gravid uterus   Musculoskeletal: Normal range of motion.         General: No swelling or tenderness.      Right lower leg: No edema.      Left lower leg: No edema.   Skin:     General: Skin is warm and dry.      Findings: No rash.   Neurological:      General: No focal deficit present.      Mental Status: She is alert.      Gait: Gait normal.         ED Course        Procedures             EKG Interpretation:      Interpreted by SELAM HERNÁNDEZ MD, MD  Time reviewed: 2230  Symptoms at time of EKG: none   Rhythm: normal sinus   Rate: 64  Axis: normal  Ectopy: none  Conduction: normal  ST Segments/ T Waves: No ST-T wave changes  Q Waves: none  Comparison to prior: Unchanged    Clinical Impression: normal EKG    Results for orders placed or performed during the hospital encounter of 12/24/19   UA with Microscopic     Status: Abnormal   Result Value Ref Range    Color Urine Yellow     Appearance Urine Slightly Cloudy     Glucose Urine Negative NEG^Negative mg/dL    Bilirubin Urine Negative NEG^Negative    Ketones Urine  5 (A) NEG^Negative mg/dL    Specific Gravity Urine 1.029 1.003 - 1.035    Blood Urine Negative NEG^Negative    pH Urine 6.0 5.0 - 7.0 pH    Protein Albumin Urine 30 (A) NEG^Negative mg/dL    Urobilinogen mg/dL Normal 0.0 - 2.0 mg/dL    Nitrite Urine Negative NEG^Negative    Leukocyte Esterase Urine Large (A) NEG^Negative    Source Midstream Urine     WBC Urine 39 (H) 0 - 5 /HPF    RBC Urine 3 (H) 0 - 2 /HPF    Bacteria Urine Few (A) NEG^Negative /HPF    Squamous Epithelial /HPF Urine 19 (H) 0 - 1 /HPF    Mucous Urine Present (A) NEG^Negative /LPF           Critical Care time:      Labs Ordered and Resulted from Time of ED Arrival Up to the Time of Departure from the ED   ROUTINE UA WITH MICROSCOPIC - Abnormal; Notable for the following components:       Result Value    Ketones Urine 5 (*)     Protein Albumin Urine 30 (*)     Leukocyte Esterase Urine Large (*)     WBC Urine 39 (*)     RBC Urine 3 (*)     Bacteria Urine Few (*)     Squamous Epithelial /HPF Urine 19 (*)     Mucous Urine Present (*)     All other components within normal limits            Assessments & Plan (with Medical Decision Making)   39 year old female who is 26 weeks pregnant to the emergency department complaining of anxiety and likely associated dyspnea.  She reports a great deal of stress related to work and her pregnancy.  She states that this is her first pregnancy.  She also has 2 additional children here with her that she is caring for.  The patient has normal vital signs.  She appears clinically well.  Her lungs are clear and her oxygen saturations are normal.  She does not have any signs or symptoms concerning for DVT.  I have low clinical suspicion for pulmonary embolism.  The patient's EKG is normal.  Her urine specimen appears contaminated and she does not have UTI symptoms so will not pursue further as she does not have any signs or symptoms concerning for infection such as fever.  Patient was offered further testing with chest  radiograph and labs which she declined.  She is feeling well and asymptomatic at the time of discharge.  OB clinic follow-up per routine.    I have reviewed the nursing notes.    I have reviewed the findings, diagnosis, plan and need for follow up with the patient.    Discharge Medication List as of 12/24/2019 11:19 PM          Final diagnoses:   Stress and adjustment reaction       12/24/2019   [facility], EMERGENCY DEPARTMENT     Micheal Vaca MD  12/24/19 3797

## 2019-12-25 NOTE — DISCHARGE INSTRUCTIONS
Try to maintain a regular schedule and sleep pattern.  Drink plenty of fluids to maintain your hydration.  Follow-up with your OB doctor as planned.    Return to the emergency department if you have chest pain, fever, cough, worsening symptoms, or other concerns.

## 2020-02-08 ENCOUNTER — HEALTH MAINTENANCE LETTER (OUTPATIENT)
Age: 40
End: 2020-02-08

## 2020-05-02 ENCOUNTER — APPOINTMENT (OUTPATIENT)
Dept: CT IMAGING | Facility: CLINIC | Age: 40
End: 2020-05-02
Attending: EMERGENCY MEDICINE
Payer: COMMERCIAL

## 2020-05-02 ENCOUNTER — HOSPITAL ENCOUNTER (EMERGENCY)
Facility: CLINIC | Age: 40
Discharge: HOME | End: 2020-05-02
Attending: EMERGENCY MEDICINE | Admitting: EMERGENCY MEDICINE
Payer: COMMERCIAL

## 2020-05-02 VITALS
HEIGHT: 67 IN | HEART RATE: 67 BPM | SYSTOLIC BLOOD PRESSURE: 120 MMHG | WEIGHT: 169 LBS | RESPIRATION RATE: 16 BRPM | DIASTOLIC BLOOD PRESSURE: 80 MMHG | TEMPERATURE: 98.2 F | BODY MASS INDEX: 26.53 KG/M2 | OXYGEN SATURATION: 100 %

## 2020-05-02 DIAGNOSIS — S09.90XA INJURY OF HEAD, INITIAL ENCOUNTER: ICD-10-CM

## 2020-05-02 PROCEDURE — 99284 EMERGENCY DEPT VISIT MOD MDM: CPT | Mod: 25 | Performed by: EMERGENCY MEDICINE

## 2020-05-02 PROCEDURE — 70450 CT HEAD/BRAIN W/O DYE: CPT

## 2020-05-02 PROCEDURE — 99284 EMERGENCY DEPT VISIT MOD MDM: CPT | Mod: Z6 | Performed by: EMERGENCY MEDICINE

## 2020-05-02 PROCEDURE — 25000132 ZZH RX MED GY IP 250 OP 250 PS 637: Performed by: EMERGENCY MEDICINE

## 2020-05-02 RX ORDER — IBUPROFEN 600 MG/1
600 TABLET, FILM COATED ORAL ONCE
Status: COMPLETED | OUTPATIENT
Start: 2020-05-02 | End: 2020-05-02

## 2020-05-02 RX ORDER — ACETAMINOPHEN 500 MG
1000 TABLET ORAL ONCE
Status: COMPLETED | OUTPATIENT
Start: 2020-05-02 | End: 2020-05-02

## 2020-05-02 RX ADMIN — IBUPROFEN 600 MG: 600 TABLET ORAL at 15:13

## 2020-05-02 RX ADMIN — ACETAMINOPHEN 1000 MG: 500 TABLET, FILM COATED ORAL at 15:13

## 2020-05-02 ASSESSMENT — MIFFLIN-ST. JEOR: SCORE: 1459.21
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
## 2020-05-02 NOTE — ED AVS SNAPSHOT
[facility], [place], Emergency Department  [address] [address]  Phone:  [phone]                                    Rachel Osei   MRN: [id number]    Department:  [facility], Emergency Department   Date of Visit:  5/2/2020           After Visit Summary Signature Page    I have received my discharge instructions, and my questions have been answered. I have discussed any challenges I see with this plan with the nurse or doctor.    ..........................................................................................................................................  Patient/Patient Representative Signature      ..........................................................................................................................................  Patient Representative Print Name and Relationship to Patient    ..................................................               ................................................  Date                                   Time    ..........................................................................................................................................  Reviewed by Signature/Title    ...................................................              ..............................................  Date                                               Time          22EPIC Rev 08/18

## 2020-05-04 ASSESSMENT — ENCOUNTER SYMPTOMS
NECK PAIN: 0
BRUISES/BLEEDS EASILY: 0
ROS GI COMMENTS: NO INCONTINENCE
FATIGUE: 0
NAUSEA: 0
FEVER: 0
SPEECH DIFFICULTY: 0
BACK PAIN: 1
WEAKNESS: 0
SHORTNESS OF BREATH: 0
EYE PAIN: 0
COUGH: 0
RHINORRHEA: 0
ARTHRALGIAS: 0
VOMITING: 0
NECK STIFFNESS: 0
PALPITATIONS: 0
WOUND: 1
SEIZURES: 0
TROUBLE SWALLOWING: 0
NUMBNESS: 0
TREMORS: 0
JOINT SWELLING: 0
ABDOMINAL PAIN: 0
COLOR CHANGE: 0
SORE THROAT: 0
LIGHT-HEADEDNESS: 0

## 2020-05-04 ASSESSMENT — VISUAL ACUITY: OU: 1
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
## 2020-05-08 ENCOUNTER — VIRTUAL VISIT (OUTPATIENT)
Dept: PHYSICAL MEDICINE AND REHAB | Facility: CLINIC | Age: 40
End: 2020-05-08
Payer: COMMERCIAL

## 2020-05-08 DIAGNOSIS — G44.309 POST-TRAUMATIC HEADACHE, NOT INTRACTABLE, UNSPECIFIED CHRONICITY PATTERN: ICD-10-CM

## 2020-05-08 DIAGNOSIS — M54.81 OCCIPITAL NEURALGIA OF LEFT SIDE: ICD-10-CM

## 2020-05-08 DIAGNOSIS — S06.0X1A CONCUSSION WITH LOSS OF CONSCIOUSNESS OF 30 MINUTES OR LESS, INITIAL ENCOUNTER: Primary | ICD-10-CM

## 2020-05-08 DIAGNOSIS — S16.1XXA CERVICAL MYOFASCIAL STRAIN, INITIAL ENCOUNTER: ICD-10-CM

## 2020-05-08 RX ORDER — IBUPROFEN 800 MG/1
800 TABLET, FILM COATED ORAL EVERY 8 HOURS PRN
Qty: 14 TABLET | Refills: 0 | Status: SHIPPED | OUTPATIENT
Start: 2020-05-08 | End: 2020-05-08

## 2020-05-08 RX ORDER — IBUPROFEN 600 MG/1
TABLET, FILM COATED ORAL
COMMUNITY
Start: 2020-03-29 | End: 2020-05-08

## 2020-05-08 RX ORDER — ACETAMINOPHEN 500 MG
1000 TABLET ORAL
COMMUNITY
Start: 2020-03-29 | End: 2024-06-26

## 2020-05-08 RX ORDER — IBUPROFEN 600 MG/1
600 TABLET, FILM COATED ORAL EVERY 6 HOURS PRN
Qty: 120 TABLET | Refills: 0 | Status: SHIPPED | OUTPATIENT
Start: 2020-05-08 | End: 2020-06-22

## 2020-05-08 RX ORDER — IBUPROFEN 600 MG/1
600 TABLET, FILM COATED ORAL EVERY 6 HOURS PRN
Qty: 120 TABLET | Refills: 0 | Status: SHIPPED | OUTPATIENT
Start: 2020-05-08 | End: 2020-06-07

## 2020-05-08 RX ORDER — LIDOCAINE HYDROCHLORIDE 20 MG/ML
JELLY TOPICAL 2 TIMES DAILY PRN
Qty: 10 ML | Refills: 0 | Status: SHIPPED | OUTPATIENT
Start: 2020-05-08 | End: 2022-07-17

## 2020-05-08 RX ORDER — FERROUS SULFATE 325(65) MG
325 TABLET ORAL
COMMUNITY
Start: 2020-01-14 | End: 2024-06-26
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
## 2020-05-08 NOTE — PATIENT INSTRUCTIONS
"    GENERAL ADVICE:  ~ Gradually ease back into your usual activities.   ~ Rest as needed to help your symptoms go away.  - Consider pairing 50 minutes of activity with 10 minutes of rest.  ~ Allow yourself more time for activities.  ~ Write things down.  At home, at work, whenever there is something that you should remember, even it is simple.  SCREENS:  ~ Change settings on your phone and computer using the \"Blue Light Filter\" (Night Shift on all  [employer] products)  ~ The goal is making screens more yellow and less blue.    ~ If this is not an option you can download this program, Plays.IO, to adjust your screen resolution.  ~ [employer] for various filter and font apps  ~ Turn screen brightness down  ~ Increase font size  ~ Limit screen activities including computer, TV and phones.  NECK PAIN:  ~ Ice or Heat are good~  ~ Massage is ok if it doesn't trigger more symptoms~  ~ Gentle stretches and range-of-motion are helpful.  DIZZINESS:  ~ No driving when dizzy.  ~ No biking, climbing heights or ladders if dizzy.  FATIGUE:  ~ Daily exercise is strongly encouraged.  Start with a 10 min walk and increase the time as tolerated until you are walking 30 minutes per day.    ~ Focus on Good sleep hygiene instead of napping . Your goal should be 8 hours of sleep every night.  ~ Try Melatonin 1 hour before bed  ANXIETY OR MOOD SWINGS:  ~ If you are irritable or anxious, take a break in a quiet room.  ~ Try using the free Calm bozena for guided breathing and mindfulness/meditation.  ~ Explore [employer] (https://www.headspace.com) for free and easy-to-use meditation guidance.  LIGHT SENSITIVITIES:  ~ Avoid florescent lighting when possible.  ~Yellow or christina tinted lenses may help reduce computer or night-time road glare.             ~ Amazon has a $10.00 option: [employer] yellow Night Vision.  NOISE SENSITIVITIES:  ~ Try listening to calming sounds such as the \"Calm Bozena\" to help shift your focus off of more irritating " sounds.  ~ Avoid crowded areas at first then slowly introduce yourself to small increments of crowded, noisy areas.  ~ Try High fidelity earplugs used by [employer] Etymotic ETY-Plugs, can be found on Amazon for $13.00.  DIET:  - In principle incorporate more protein, lots of veggies, some fruit, whole grains.    - Less sweets and saturated fat.   - Mediterranean Diet is an easy-to-follow example.  ~ Drink plenty of water throughout the day (8-10 glasses per day)    PM&R / M [facility]   Nurse Line # [phone]   Fax # [phone]  Scheduling; # [phone]  E-MAIL - [email or web address]    Thank you for allowing us to be a part of your care.
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
## 2020-06-22 ENCOUNTER — VIRTUAL VISIT (OUTPATIENT)
Dept: PHYSICAL MEDICINE AND REHAB | Facility: CLINIC | Age: 40
End: 2020-06-22
Payer: COMMERCIAL

## 2020-06-22 VITALS — WEIGHT: 170 LBS | BODY MASS INDEX: 26.68 KG/M2 | HEIGHT: 67 IN

## 2020-06-22 DIAGNOSIS — R41.840 ATTENTION AND CONCENTRATION DEFICIT: ICD-10-CM

## 2020-06-22 DIAGNOSIS — R53.83 FATIGUE DUE TO OLD HEAD INJURY: ICD-10-CM

## 2020-06-22 DIAGNOSIS — S06.0X0D CONCUSSION WITHOUT LOSS OF CONSCIOUSNESS, SUBSEQUENT ENCOUNTER: Primary | ICD-10-CM

## 2020-06-22 DIAGNOSIS — S16.1XXD CERVICAL MYOFASCIAL STRAIN, SUBSEQUENT ENCOUNTER: ICD-10-CM

## 2020-06-22 DIAGNOSIS — S09.90XS FATIGUE DUE TO OLD HEAD INJURY: ICD-10-CM

## 2020-06-22 ASSESSMENT — MIFFLIN-ST. JEOR: SCORE: 1463.74
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
## 2020-08-28 ENCOUNTER — VIRTUAL VISIT (OUTPATIENT)
Dept: PHYSICAL MEDICINE AND REHAB | Facility: CLINIC | Age: 40
End: 2020-08-28
Payer: COMMERCIAL

## 2020-08-28 VITALS — HEIGHT: 67 IN | WEIGHT: 170 LBS | BODY MASS INDEX: 26.68 KG/M2

## 2020-08-28 DIAGNOSIS — S06.0X0D CONCUSSION WITHOUT LOSS OF CONSCIOUSNESS, SUBSEQUENT ENCOUNTER: Primary | ICD-10-CM

## 2020-08-28 DIAGNOSIS — R41.840 ATTENTION AND CONCENTRATION DEFICIT: ICD-10-CM

## 2020-08-28 ASSESSMENT — MIFFLIN-ST. JEOR: SCORE: 1463.74
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
## 2020-11-08 ENCOUNTER — HEALTH MAINTENANCE LETTER (OUTPATIENT)
Age: 40
End: 2020-11-08

## 2021-03-28 ENCOUNTER — HEALTH MAINTENANCE LETTER (OUTPATIENT)
Age: 41
End: 2021-03-28

## 2021-07-07 ENCOUNTER — OFFICE VISIT (OUTPATIENT)
Dept: FAMILY MEDICINE | Facility: CLINIC | Age: 41
End: 2021-07-07

## 2021-07-07 VITALS
SYSTOLIC BLOOD PRESSURE: 121 MMHG | WEIGHT: 200 LBS | TEMPERATURE: 98.5 F | OXYGEN SATURATION: 97 % | BODY MASS INDEX: 31.39 KG/M2 | HEART RATE: 74 BPM | DIASTOLIC BLOOD PRESSURE: 85 MMHG | RESPIRATION RATE: 16 BRPM | HEIGHT: 67 IN

## 2021-07-07 DIAGNOSIS — D25.0 INTRAMURAL, SUBMUCOUS, AND SUBSEROUS LEIOMYOMA OF UTERUS: Primary | ICD-10-CM

## 2021-07-07 DIAGNOSIS — M79.9 SOFT TISSUE COMPLAINT: ICD-10-CM

## 2021-07-07 DIAGNOSIS — D25.2 INTRAMURAL, SUBMUCOUS, AND SUBSEROUS LEIOMYOMA OF UTERUS: Primary | ICD-10-CM

## 2021-07-07 DIAGNOSIS — D25.1 INTRAMURAL, SUBMUCOUS, AND SUBSEROUS LEIOMYOMA OF UTERUS: Primary | ICD-10-CM

## 2021-07-07 PROCEDURE — 99203 OFFICE O/P NEW LOW 30 MIN: CPT | Mod: GC | Performed by: STUDENT IN AN ORGANIZED HEALTH CARE EDUCATION/TRAINING PROGRAM

## 2021-07-07 ASSESSMENT — MIFFLIN-ST. JEOR: SCORE: 1594.82

## 2021-07-07 NOTE — PATIENT INSTRUCTIONS
Patient Education     What Are Fibroids?  Fibroids are also called myomas and leiomyomas. They are growths that usually form in the wall of your uterus. Fibroids are the most common tumor in women. They are almost always noncancer (benign) and harmless. Fibroids are made up of connective tissue and muscle cells. They start as pea-sized lumps. But they can grow steadily during your reproductive years. Many fibroids just need to be watched. Others may need treatment if they become too large or cause symptoms.     Possible problems  Fibroids often cause no symptoms. But a fibroid that grows quickly in your uterus can cause one or more of the following problems:     Excessive uterine bleeding, leading to a lack of red blood cells (anemia)    Frequent urge to urinate    Trouble having bowel movements    Achiness, heaviness, or fullness    Back or belly (abdominal) pain    Pain during sex    Trouble getting pregnant or being unable to get pregnant    Problems with pregnancy    Enlargement of the lower abdomen  Treatment is tailored for you  No two fibroids are the same. The type of treatment you will have depends on several things, including:     How many fibroids you have, their size, location, and how fast they grow    How severe your symptoms are    If you plan to have children in the future  There are a growing number of effective ways to treat fibroids. After your medical evaluation, your healthcare provider will talk with you about the best options to solve your particular problem and meet your needs.   Your future checkups  Treating your fibroids is likely to relieve your symptoms. But your healthcare provider will want to check your progress. Ask your provider about any other follow-up visits you might need.   [employer] last reviewed this educational content on 5/1/2020 2000-2021 [employer]. All rights reserved. This information is not intended as a substitute for professional medical care. Always  follow your healthcare professional's instructions.

## 2021-07-07 NOTE — PROGRESS NOTES
Preceptor Attestation:   Patient seen and discussed with the resident. Assessment and plan reviewed with resident and agreed upon.   Supervising Physician:  DO [facility]

## 2021-07-29 ENCOUNTER — OFFICE VISIT (OUTPATIENT)
Dept: FAMILY MEDICINE | Facility: CLINIC | Age: 41
End: 2021-07-29

## 2021-07-29 VITALS
TEMPERATURE: 98.8 F | WEIGHT: 201.8 LBS | SYSTOLIC BLOOD PRESSURE: 121 MMHG | OXYGEN SATURATION: 100 % | HEART RATE: 86 BPM | RESPIRATION RATE: 16 BRPM | HEIGHT: 68 IN | DIASTOLIC BLOOD PRESSURE: 81 MMHG | BODY MASS INDEX: 30.58 KG/M2

## 2021-07-29 DIAGNOSIS — F19.90 EXCESSIVE USE OF NONSTEROIDAL ANTI-INFLAMMATORY DRUG (NSAID): ICD-10-CM

## 2021-07-29 DIAGNOSIS — F43.21 GRIEF REACTION: ICD-10-CM

## 2021-07-29 DIAGNOSIS — D25.2 INTRAMURAL, SUBMUCOUS, AND SUBSEROUS LEIOMYOMA OF UTERUS: ICD-10-CM

## 2021-07-29 DIAGNOSIS — Z00.00 ROUTINE GENERAL MEDICAL EXAMINATION AT A HEALTH CARE FACILITY: Primary | ICD-10-CM

## 2021-07-29 DIAGNOSIS — D25.1 INTRAMURAL, SUBMUCOUS, AND SUBSEROUS LEIOMYOMA OF UTERUS: ICD-10-CM

## 2021-07-29 DIAGNOSIS — D25.0 INTRAMURAL, SUBMUCOUS, AND SUBSEROUS LEIOMYOMA OF UTERUS: ICD-10-CM

## 2021-07-29 DIAGNOSIS — K92.1 BLOOD IN STOOL: ICD-10-CM

## 2021-07-29 LAB
ANION GAP SERPL CALCULATED.3IONS-SCNC: 3 MMOL/L (ref 3–14)
BUN SERPL-MCNC: 15 MG/DL (ref 7–30)
CALCIUM SERPL-MCNC: 9.6 MG/DL (ref 8.5–10.1)
CHLORIDE BLD-SCNC: 105 MMOL/L (ref 94–109)
CHOLEST SERPL-MCNC: 217 MG/DL
CO2 SERPL-SCNC: 28 MMOL/L (ref 20–32)
CREAT SERPL-MCNC: 0.9 MG/DL (ref 0.52–1.04)
ERYTHROCYTE [DISTWIDTH] IN BLOOD BY AUTOMATED COUNT: 14.4 % (ref 10–15)
FASTING STATUS PATIENT QL REPORTED: NO
GFR SERPL CREATININE-BSD FRML MDRD: 79 ML/MIN/1.73M2
GLUCOSE BLD-MCNC: 66 MG/DL (ref 70–99)
HCT VFR BLD AUTO: 39.3 %
HDLC SERPL-MCNC: 68 MG/DL
HGB BLD-MCNC: 12.5 G/DL
LDLC SERPL CALC-MCNC: 120 MG/DL
MCH RBC QN AUTO: 27.7 PG (ref 26.5–33)
MCHC RBC AUTO-ENTMCNC: 31.8 G/DL (ref 31.5–36.5)
MCV RBC AUTO: 87 FL (ref 78–100)
NONHDLC SERPL-MCNC: 149 MG/DL
PLATELET # BLD AUTO: 264 10E3/UL (ref 150–450)
POTASSIUM BLD-SCNC: 4.2 MMOL/L (ref 3.4–5.3)
RBC # BLD AUTO: 4.52 10E6/UL
SODIUM SERPL-SCNC: 136 MMOL/L (ref 133–144)
TRIGL SERPL-MCNC: 145 MG/DL
WBC # BLD AUTO: 7.4 10E3/UL (ref 4–11)

## 2021-07-29 PROCEDURE — 99213 OFFICE O/P EST LOW 20 MIN: CPT | Mod: 25 | Performed by: FAMILY MEDICINE

## 2021-07-29 PROCEDURE — 87389 HIV-1 AG W/HIV-1&-2 AB AG IA: CPT | Performed by: FAMILY MEDICINE

## 2021-07-29 PROCEDURE — 80048 BASIC METABOLIC PNL TOTAL CA: CPT | Performed by: FAMILY MEDICINE

## 2021-07-29 PROCEDURE — 80061 LIPID PANEL: CPT | Performed by: FAMILY MEDICINE

## 2021-07-29 PROCEDURE — 85027 COMPLETE CBC AUTOMATED: CPT | Performed by: FAMILY MEDICINE

## 2021-07-29 PROCEDURE — 36415 COLL VENOUS BLD VENIPUNCTURE: CPT | Performed by: FAMILY MEDICINE

## 2021-07-29 PROCEDURE — 86803 HEPATITIS C AB TEST: CPT | Performed by: FAMILY MEDICINE

## 2021-07-29 PROCEDURE — 99396 PREV VISIT EST AGE 40-64: CPT | Performed by: FAMILY MEDICINE

## 2021-07-29 ASSESSMENT — MIFFLIN-ST. JEOR: SCORE: 1612.48

## 2021-07-29 NOTE — LETTER
August 13, 2021      Rachel Osei  [address] [facility] [address]        Dear Rachel,    I was notified that you had not yet received your results via [facility].   Enclosed are your test results. These are all within normal limits!     Resulted Orders   Lipid Cascade   Result Value Ref Range    Cholesterol 217 (H) <200 mg/dL      Comment:      Age 0-19 years  Desirable: <170 mg/dL  Borderline high:  170-199 mg/dl  High:            >199 mg/dl    Age 20 years and older  Desirable: <200 mg/dL    Triglycerides 145 <150 mg/dL      Comment:      0-9 years:  Normal:    Less than 75 mg/dL  Borderline high:  75-99 mg/dL  High:             Greater than or equal to 100 mg/dL    0-19 years:  Normal:    Less than 90 mg/dL  Borderline high:   mg/dL  High:             Greater than or equal to 130 mg/dL    20 years and older:  Normal:    Less than 150 mg/dL  Borderline high:  150-199 mg/dL  High:             200-499 mg/dL  Very high:   Greater than or equal to 500 mg/dL    Direct Measure HDL 68 >=50 mg/dL      Comment:      0-19 years:       Greater than or equal to 45 mg/dL   Low: Less than 40 mg/dL   Borderline low: 40-44 mg/dL     20 years and older:   Female: Greater than or equal to 50 mg/dL   Male:   Greater than or equal to 40 mg/dL         LDL Cholesterol Calculated 120 (H) <=100 mg/dL      Comment:      Age 0-19 years:  Desirable: 0-110 mg/dL   Borderline high: 110-129 mg/dL   High: >= 130 mg/dL    Age 20 years and older:  Desirable: <100mg/dL  Above desirable: 100-129 mg/dL   Borderline high: 130-159 mg/dL   High: 160-189 mg/dL   Very high: >= 190 mg/dL    Non HDL Cholesterol 149 (H) <130 mg/dL      Comment:      0-19 years:  Desirable:          Less than 120 mg/dL  Borderline high:   120-144 mg/dL  High:                   Greater than or equal to 145 mg/dL    20 years and older:  Desirable:          130 mg/dL  Above Desirable: 130-159 mg/dL  Borderline high:   160-189 mg/dL  High:                190-219 mg/dL  Very high:     Greater than or equal to 220 mg/dL    Patient Fasting > 8hrs? No    HIV Antigen Antibody Combo   Result Value Ref Range    HIV Antigen Antibody Combo Nonreactive Nonreactive      Comment:      HIV-1 p24 Ag & HIV-1/HIV-2 Ab Not Detected   Hepatitis C antibody   Result Value Ref Range    Hepatitis C Antibody Nonreactive Nonreactive    Narrative    Assay performance characteristics have not been established for newborns, infants, and children.   CBC with platelets   Result Value Ref Range    WBC Count 7.4 4.0 - 11.0 10e3/uL    RBC Count 4.52 10e6/uL    Hemoglobin 12.5 g/dL    Hematocrit 39.3 %    MCV 87 78 - 100 fL    MCH 27.7 26.5 - 33.0 pg    MCHC 31.8 31.5 - 36.5 g/dL    RDW 14.4 10.0 - 15.0 %    Platelet Count 264 150 - 450 10e3/uL   Basic metabolic panel   Result Value Ref Range    Sodium 136 133 - 144 mmol/L    Potassium 4.2 3.4 - 5.3 mmol/L    Chloride 105 94 - 109 mmol/L    Carbon Dioxide (CO2) 28 20 - 32 mmol/L    Anion Gap 3 3 - 14 mmol/L    Urea Nitrogen 15 7 - 30 mg/dL    Creatinine 0.90 0.52 - 1.04 mg/dL    Calcium 9.6 8.5 - 10.1 mg/dL    Glucose 66 (L) 70 - 99 mg/dL    GFR Estimate 79 >60 mL/min/1.73m2      Comment:      As of July 11, 2021, eGFR is calculated by the CKD-EPI creatinine equation, without race adjustment. eGFR can be influenced by muscle mass, exercise, and diet. The reported eGFR is an estimation only and is only applicable if the renal function is stable.       If you have any questions, please call the clinic to make an appointment.    Sincerely,    Berenice Arndt, DO

## 2021-07-29 NOTE — PROGRESS NOTES
Female Physical Note          HPI         Concerns today:   - Dental pain: Had permanent bridge placed to left lower molar on 6/24/21. Had two additional manipulations performed last week. Still having pain. She is concerned the pain is now related to gums, not teeth. Concerned about gum infection or ear infection. Using Advil for pain control with good relief. Takes 1-4 tablets per day.   - Cramping abdominal pain: Has history of fibroids. Has cramping abdominal pain intermittently. Improving after pregnancy. Went to [facility]- they requested CBC/BMP performed. Overall getting better. Feels comfortable monitoring, and if worsening can consider repeat US.   - Stress: Works as [employer]. Has three children at home. Adopted older two after brother was murdered four years ago. Has 1-year-old infant as well. Pandemic was stressful with everyone at home. Had a therapist she really connected with who unfortunately passed away. Looking for a new therapist.   - Weight loss: Working on losing weight after pregnancy. Has made significant diet changes with cutting out fast food, moving towards plant based diet, and only eating lean meat. Exercises by walking hours per day. Does not feel she needs further resources today.   - History of rectal bleeding: Was having bloody stools prior to and during pregnancy. Feels she had hemorrhoids because she had to push one back in. Has not noticed any bleeding for the past few months. Changed her diet. Was supposed to have colonoscopy to ensure that it was hemorrhoids, but was not able to because she then became pregnant. Open to FITT test to make sure bleeding has resolved. No family history of colon cancer.   - Preventative Health: Due for mammogram, lipids, HIV/Hep C. Still considering COVID vaccine, not ready for it today. See below.     Patient Active Problem List   Diagnosis     Migraine without aura       Past Medical History:   Diagnosis Date     Intramural,  submucous, and subserous leiomyoma of uterus     dx during pregnancy 11/2019     Family History   Problem Relation Age of Onset     Hypertension Father            Review of Systems:     Review of Systems:  CONSTITUTIONAL: NEGATIVE for fever, chills, weight loss  INTEGUMENTARY/SKIN: NEGATIVE for worrisome rashes, moles or lesions  EYES: NEGATIVE for vision changes or irritation  ENT/MOUTH: NEGATIVE for ear, mouth and throat problems  RESP: NEGATIVE for significant cough or SOB  BREAST: NEGATIVE for masses, tenderness or discharge  CV: NEGATIVE for palpitations or peripheral edema. Reports intermittent chest pain with anxiety   GI: NEGATIVE for nausea, heartburn. Abdominal pain as above. Intermittent diarrhea/constipation   : NEGATIVE for frequency, dysuria, or hematuria  MUSCULOSKELETAL: NEGATIVE for significant arthralgias or myalgia  NEURO: NEGATIVE for weakness, dizziness or paresthesias  ENDOCRINE: NEGATIVE for temperature intolerance, skin/hair changes  HEME/ALLERGY: NEGATIVE for bleeding problems  PSYCHIATRIC: NEGATIVE for changes in mood or affect    Sleep:   Do you snore most or the night (as reported by a family member)? No  Do you feel sleepy or extremely tired during most of the day? No         Social History     Social History     Socioeconomic History     Marital status: Single     Spouse name: Not on file     Number of children: Not on file     Years of education: Not on file     Highest education level: Not on file   Occupational History     Not on file   Tobacco Use     Smoking status: Never Smoker     Smokeless tobacco: Never Used   Substance and Sexual Activity     Alcohol use: No     Drug use: No     Sexual activity: Yes     Partners: Male   Other Topics Concern     Not on file   Social History Narrative    Works at law firm as       Social Determinants of Health     Financial Resource Strain:      Difficulty of Paying Living Expenses:    Food Insecurity:      Worried About Running Out  of Food in the Last Year:      Ran Out of Food in the Last Year:    Transportation Needs:      Lack of Transportation (Medical):      Lack of Transportation (Non-Medical):    Physical Activity:      Days of Exercise per Week:      Minutes of Exercise per Session:    Stress:      Feeling of Stress :    Social Connections:      Frequency of Communication with Friends and Family:      Frequency of Social Gatherings with Friends and Family:      Attends Gnosticist Services:      Active Member of Clubs or Organizations:      Attends Club or Organization Meetings:      Marital Status:    Intimate Partner Violence:      Fear of Current or Ex-Partner:      Emotionally Abused:      Physically Abused:      Sexually Abused:        Marital Status: Single  Who lives in your household? Lives with her three children.     Has anyone hurt you physically, for example by pushing, hitting, slapping or kicking you or forcing you to have sex? Denies  Do you feel threatened or controlled by a partner, ex-partner or anyone in your life? Denies    Sexual Health     Sexual concerns: No   Not currently sexually active   Not on any birth control. Knows about options   STI History: Neg  Pregnancy History:   LMP Patient's last menstrual period was 2021 (within days). Lasts 5-7 days with cramping.   Last Pap Smear Date:   Lab Results   Component Value Date    PAP NIL 2010     Abnormal Pap History: None    Recommended Screening     - Cholesterol Level (>46 yo or at risk):  Recommended and patient accepted testing.   - Pap/HPV cotest every 5 years for women 30-65: UTD. Last . NILM. No co-testing performed at that time.    - HIV screening:  Recommended and patient accepted testing.  Breast CA Screening (>41 yo or 10 y before 1st degree relative diagnosis): Recommended and patient accepted testing. No family history of breast cancer.          Physical Exam:     Vitals: /81   Pulse 86   Temp 98.8  F (37.1  C) (Oral)   Resp  "16   Ht 1.717 m (5' 7.6\")   Wt 91.5 kg (201 lb 12.8 oz)   LMP 07/12/2021 (Within Days)   SpO2 100%   BMI 31.05 kg/m    BMI= Body mass index is 31.05 kg/m .   GENERAL: healthy, alert and no distress  EYES: Eyes grossly normal to inspection, extraocular movements - intact, and PERRL  HENT: ear canals- cerumen impaction bilaterally; TM's not visualized; Nose- normal; Mouth- no ulcers, no lesions, no pus/drainage noted to left lower gumline- does not appear infected   NECK: no tenderness, no adenopathy, no asymmetry, no masses, no stiffness; thyroid- normal to palpation  RESP: lungs clear to auscultation - no rales, no rhonchi, no wheezes  BREAST: Declined by patient- no concerns.   CV: regular rates and rhythm, normal S1 S2, no S3 or S4 and no murmur, no click or rub -  ABDOMEN: soft, no tenderness, no  hepatosplenomegaly, no masses, normal bowel sounds  MS: extremities- no gross deformities noted, no edema  SKIN: no suspicious lesions, no rashes  NEURO: strength and tone- normal, sensory exam- grossly normal, mentation- intact, speech- normal, reflexes- symmetric  - female: Declined by patient- no concerns.  RECTAL- female: Declined by patient.  PSYCH: Alert and oriented times 3; speech- coherent , normal rate and volume; able to articulate logical thoughts, able to abstract reason, no tangential thoughts, no hallucinations or delusions, affect- normal  LYMPHATICS: ant. cervical- normal, post. cervical- normal    Assessment and Plan     (Z00.00) Routine general medical examination at a health care facility  (primary encounter diagnosis): Overall, well-appearing adult with reassuring PE. Testing for fibroids and bloody stools as below. Suspect tooth pain is related to recent dental work- no signs of infection on exam, she will return if symptoms are not improving, may consider TMJ at that time. Health maintenance updated. Advised patient to re-establish with therapy to manage stress/anxiety- referral placed " today. Encouraged her to continue healthy eating and exercise habits. She will be called with lab results and any necessary follow-up.   - Screening mammogram referral placed   - Behavioral Health referral placed   - Lipids, HIV, and Hep C screening pending     (D25.1,  D25.0,  D25.2) Intramural, submucous, and subserous leiomyoma of uterus: Testing requested by [facility]. Ordered as below.   - CBC and BMP pending     (K92.1) Blood in stool: Resolved. Suspect previous bleeding was due to hemorrhoids that improved after pregnancy. No family history of colon cancer. FITT testing ordered to rule out occult blood.   - FITT test     Options for treatment and follow-up care were reviewed with the patient . Rachel Osei and/or guardian engaged in the decision making process and verbalized understanding of the options discussed and agreed with the final plan.    Cheryl Hernandez, MS4    I was present with the medical student who participated in the service and in the documentation of this note. I have verified the history and personally performed the physical exam and medical decision making, and have verified the content of the note, which accurately reflects my assessment of the patient and the plan of care.   Berenice Arndt, DO

## 2021-07-30 LAB
HCV AB SERPL QL IA: NONREACTIVE
HIV 1+2 AB+HIV1 P24 AG SERPL QL IA: NONREACTIVE

## 2021-08-03 ENCOUNTER — TELEPHONE (OUTPATIENT)
Dept: PSYCHOLOGY | Facility: CLINIC | Age: 41
End: 2021-08-03

## 2021-08-03 NOTE — TELEPHONE ENCOUNTER
Mental Health Referral:  Please schedule with Dr. Singh      Please let patient know this is for primary care behavioral health services which means we provide short-term therapy services.  Therapists at our clinic are able to see patients for 8-12 sessions. If further assistance is needed, they will help the patient connect with ongoing services in the community.    Check with the patient if prefer an in person, video or telephone visit.  If they choose a video visit, they will need access to either a smartphone or a laptop with camera/microphone.  If they can do a video visit, please schedule as a video visit.  If they do not have the technology to do a video visit, please schedule as a telephone visit. For either visit, please put the phone number or email in the appt notes that the provider is supposed to call or send the visit invite.  There are some instructions regarding how the video visits work for patients below. This can be copied/pasted into a [employer] message if the patient would like more information.      If you are unable to reach the patient after two phone attempts, please send a letter and close the encounter.      Thank you!    Charisse Weathers PsyD

## 2021-08-09 ENCOUNTER — NURSE TRIAGE (OUTPATIENT)
Dept: NURSING | Facility: CLINIC | Age: 41
End: 2021-08-09

## 2021-08-09 NOTE — TELEPHONE ENCOUNTER
Candy with [facility] with Arcenio Fernandez and is calling for pap result.  FNA transferred Candy through to Medical Records.    COVID 19 Nurse Triage Plan/Patient Instructions    Please be aware that novel coronavirus (COVID-19) may be circulating in the community. If you develop symptoms such as fever, cough, or SOB or if you have concerns about the presence of another infection including coronavirus (COVID-19), please contact your health care provider or visit [email or web address].     Disposition/Instructions    Home care recommended. Follow home care protocol based instructions.    Thank you for taking steps to prevent the spread of this virus.  o Limit your contact with others.  o Wear a simple mask to cover your cough.  o Wash your hands well and often.    Resources    [facility]: About COVID-19: [email or web address]    CDC: What to Do If You're Sick: www.cdc.gov/coronavirus/2019-ncov/about/steps-when-sick.html    CDC: Ending Home Isolation: www.cdc.gov/coronavirus/2019-ncov/hcp/disposition-in-home-patients.html     CDC: Caring for Someone: www.cdc.gov/coronavirus/2019-ncov/if-you-are-sick/care-for-someone.html     [facility]: Interim Guidance for Hospital Discharge to Home: www.health.[facility].[place]/diseases/coronavirus/hcp/hospdischarge.pdf    [facility] clinical trials (COVID-19 research studies): [email or web address]-clinical-trials     Below are the COVID-19 hotlines at the [place] Department [facility] ([facility]). Interpreters are available.   o For health questions: Call 258-984-7516 or 1-467.493.4640 (7 a.m. to 7 p.m.)  o For questions about schools and childcare: Call 770-459-3544 or 1-792.283.2229 (7 a.m. to 7 p.m.)                     Additional Information    Negative: Nursing judgment    Negative: Nursing judgment    Negative: Nursing judgment    Negative: Nursing judgment    Information only question and nurse able to answer    Protocols used: INFORMATION  ONLY CALL - NO TRIAGE-A-[place]       WDL

## 2021-08-17 NOTE — TELEPHONE ENCOUNTER
8/17/21 Attempted to reach patient to schedule  appointment with . Left message on patient voicemail, along with my direct number.    Cheyenne Croft  Care Coordinator

## 2021-08-20 NOTE — TELEPHONE ENCOUNTER
8/20/21 Attempted once again to reach out to patient to scheduled  appointment with . Phone rang and then disconnected. Sending letter to patient home.    Cheyenne Croft  Care Coordinator

## 2021-09-11 ENCOUNTER — HEALTH MAINTENANCE LETTER (OUTPATIENT)
Age: 41
End: 2021-09-11

## 2022-07-17 ENCOUNTER — HOSPITAL ENCOUNTER (EMERGENCY)
Facility: CLINIC | Age: 42
Discharge: HOME | End: 2022-07-17
Attending: EMERGENCY MEDICINE | Admitting: EMERGENCY MEDICINE

## 2022-07-17 VITALS
BODY MASS INDEX: 28.56 KG/M2 | DIASTOLIC BLOOD PRESSURE: 90 MMHG | HEART RATE: 50 BPM | TEMPERATURE: 98 F | WEIGHT: 182 LBS | OXYGEN SATURATION: 100 % | HEIGHT: 67 IN | RESPIRATION RATE: 16 BRPM | SYSTOLIC BLOOD PRESSURE: 141 MMHG

## 2022-07-17 DIAGNOSIS — R42 LIGHTHEADEDNESS: ICD-10-CM

## 2022-07-17 DIAGNOSIS — R11.0 NAUSEA: ICD-10-CM

## 2022-07-17 LAB
ALBUMIN SERPL-MCNC: 3.7 G/DL (ref 3.4–5)
ALBUMIN UR-MCNC: 70 MG/DL
ALP SERPL-CCNC: 84 U/L (ref 40–150)
ALT SERPL W P-5'-P-CCNC: 15 U/L (ref 0–50)
ANION GAP SERPL CALCULATED.3IONS-SCNC: 5 MMOL/L (ref 3–14)
APPEARANCE UR: ABNORMAL
AST SERPL W P-5'-P-CCNC: 13 U/L (ref 0–45)
BASOPHILS # BLD AUTO: 0 10E3/UL (ref 0–0.2)
BASOPHILS NFR BLD AUTO: 0 %
BILIRUB SERPL-MCNC: 0.4 MG/DL (ref 0.2–1.3)
BILIRUB UR QL STRIP: NEGATIVE
BUN SERPL-MCNC: 12 MG/DL (ref 7–30)
CALCIUM SERPL-MCNC: 9.2 MG/DL (ref 8.5–10.1)
CHLORIDE BLD-SCNC: 109 MMOL/L (ref 94–109)
CLUE CELLS: ABNORMAL
CO2 SERPL-SCNC: 28 MMOL/L (ref 20–32)
COLOR UR AUTO: ABNORMAL
CREAT SERPL-MCNC: 1.06 MG/DL (ref 0.52–1.04)
EOSINOPHIL # BLD AUTO: 0 10E3/UL (ref 0–0.7)
EOSINOPHIL NFR BLD AUTO: 1 %
ERYTHROCYTE [DISTWIDTH] IN BLOOD BY AUTOMATED COUNT: 14.6 % (ref 10–15)
GFR SERPL CREATININE-BSD FRML MDRD: 66 ML/MIN/1.73M2
GLUCOSE BLD-MCNC: 88 MG/DL (ref 70–99)
GLUCOSE UR STRIP-MCNC: NEGATIVE MG/DL
HCG UR QL: NEGATIVE
HCT VFR BLD AUTO: 38.1 % (ref 35–47)
HGB BLD-MCNC: 12.2 G/DL (ref 11.7–15.7)
HGB UR QL STRIP: ABNORMAL
IMM GRANULOCYTES # BLD: 0 10E3/UL
IMM GRANULOCYTES NFR BLD: 0 %
INTERNAL QC OK POCT: NORMAL
KETONES UR STRIP-MCNC: NEGATIVE MG/DL
LEUKOCYTE ESTERASE UR QL STRIP: ABNORMAL
LIPASE SERPL-CCNC: 114 U/L (ref 73–393)
LYMPHOCYTES # BLD AUTO: 2.1 10E3/UL (ref 0.8–5.3)
LYMPHOCYTES NFR BLD AUTO: 46 %
MCH RBC QN AUTO: 27.2 PG (ref 26.5–33)
MCHC RBC AUTO-ENTMCNC: 32 G/DL (ref 31.5–36.5)
MCV RBC AUTO: 85 FL (ref 78–100)
MONOCYTES # BLD AUTO: 0.4 10E3/UL (ref 0–1.3)
MONOCYTES NFR BLD AUTO: 9 %
MUCOUS THREADS #/AREA URNS LPF: PRESENT /LPF
NEUTROPHILS # BLD AUTO: 2.1 10E3/UL (ref 1.6–8.3)
NEUTROPHILS NFR BLD AUTO: 44 %
NITRATE UR QL: NEGATIVE
NRBC # BLD AUTO: 0 10E3/UL
NRBC BLD AUTO-RTO: 0 /100
PH UR STRIP: 6 [PH] (ref 5–7)
PLATELET # BLD AUTO: 236 10E3/UL (ref 150–450)
POCT KIT EXPIRATION DATE: NORMAL
POCT KIT LOT NUMBER: NORMAL
POTASSIUM BLD-SCNC: 3.8 MMOL/L (ref 3.4–5.3)
PROT SERPL-MCNC: 8.2 G/DL (ref 6.8–8.8)
RBC # BLD AUTO: 4.48 10E6/UL (ref 3.8–5.2)
RBC URINE: 12 /HPF
SODIUM SERPL-SCNC: 142 MMOL/L (ref 133–144)
SP GR UR STRIP: 1.02 (ref 1–1.03)
SQUAMOUS EPITHELIAL: 6 /HPF
TRANSITIONAL EPI: <1 /HPF
TRICHOMONAS, WET PREP: ABNORMAL
UROBILINOGEN UR STRIP-MCNC: NORMAL MG/DL
WBC # BLD AUTO: 4.6 10E3/UL (ref 4–11)
WBC URINE: 78 /HPF
WBC'S/HIGH POWER FIELD, WET PREP: ABNORMAL
YEAST, WET PREP: ABNORMAL

## 2022-07-17 PROCEDURE — 87491 CHLMYD TRACH DNA AMP PROBE: CPT | Performed by: EMERGENCY MEDICINE

## 2022-07-17 PROCEDURE — 99284 EMERGENCY DEPT VISIT MOD MDM: CPT | Mod: 25 | Performed by: EMERGENCY MEDICINE

## 2022-07-17 PROCEDURE — 99284 EMERGENCY DEPT VISIT MOD MDM: CPT | Performed by: EMERGENCY MEDICINE

## 2022-07-17 PROCEDURE — 96360 HYDRATION IV INFUSION INIT: CPT | Performed by: EMERGENCY MEDICINE

## 2022-07-17 PROCEDURE — 81001 URINALYSIS AUTO W/SCOPE: CPT | Performed by: EMERGENCY MEDICINE

## 2022-07-17 PROCEDURE — 87086 URINE CULTURE/COLONY COUNT: CPT | Performed by: EMERGENCY MEDICINE

## 2022-07-17 PROCEDURE — 83690 ASSAY OF LIPASE: CPT | Performed by: EMERGENCY MEDICINE

## 2022-07-17 PROCEDURE — 85014 HEMATOCRIT: CPT | Performed by: EMERGENCY MEDICINE

## 2022-07-17 PROCEDURE — 258N000003 HC RX IP 258 OP 636

## 2022-07-17 PROCEDURE — 36415 COLL VENOUS BLD VENIPUNCTURE: CPT | Performed by: EMERGENCY MEDICINE

## 2022-07-17 PROCEDURE — 87591 N.GONORRHOEAE DNA AMP PROB: CPT | Performed by: EMERGENCY MEDICINE

## 2022-07-17 PROCEDURE — 80053 COMPREHEN METABOLIC PANEL: CPT | Performed by: EMERGENCY MEDICINE

## 2022-07-17 PROCEDURE — 87210 SMEAR WET MOUNT SALINE/INK: CPT | Performed by: EMERGENCY MEDICINE

## 2022-07-17 PROCEDURE — 81025 URINE PREGNANCY TEST: CPT | Performed by: EMERGENCY MEDICINE

## 2022-07-17 RX ORDER — COVID-19 ANTIGEN TEST
220 KIT MISCELLANEOUS 2 TIMES DAILY WITH MEALS
COMMUNITY
End: 2024-06-26

## 2022-07-17 RX ORDER — SODIUM CHLORIDE 9 MG/ML
INJECTION, SOLUTION INTRAVENOUS
Status: COMPLETED
Start: 2022-07-17 | End: 2022-07-17

## 2022-07-17 RX ORDER — ONDANSETRON 4 MG/1
4 TABLET, ORALLY DISINTEGRATING ORAL EVERY 8 HOURS PRN
Qty: 10 TABLET | Refills: 0 | Status: SHIPPED | OUTPATIENT
Start: 2022-07-17 | End: 2022-07-20

## 2022-07-17 RX ADMIN — SODIUM CHLORIDE 1000 ML: 9 INJECTION, SOLUTION INTRAVENOUS at 11:41

## 2022-07-17 RX ADMIN — Medication 1000 ML: at 11:41

## 2022-07-17 ASSESSMENT — ENCOUNTER SYMPTOMS
SHORTNESS OF BREATH: 0
FEVER: 0
SORE THROAT: 0
NAUSEA: 1
PHOTOPHOBIA: 0
CHILLS: 0
COLOR CHANGE: 0
CONFUSION: 0
DIFFICULTY URINATING: 0
NECK STIFFNESS: 0
EYE REDNESS: 0
ABDOMINAL PAIN: 0
ARTHRALGIAS: 0
ADENOPATHY: 0
FATIGUE: 1
MYALGIAS: 1
VOMITING: 0
LIGHT-HEADEDNESS: 1
NUMBNESS: 0
DYSURIA: 0
APPETITE CHANGE: 1
HEADACHES: 0
BACK PAIN: 1
POLYDIPSIA: 0
BRUISES/BLEEDS EASILY: 0

## 2022-07-17 NOTE — ED NOTES
Pt has had body aches and chills on Sunday. Reports unable to eat or drink much this week d/t intermittent nausea, no vomitus. Pt also reports concern for intermmitent sharp pain in L buttocks starting this week. Pt also reports concern for new vaginal odor noticed this week- pt endorses recent unprotected intercourse and would like STI testing.

## 2022-07-17 NOTE — ED TRIAGE NOTES
Patient presents today with nausea, dizziness, and headaches for the past 7 days. Patient denies emesis. Patient reports only nausea at this present time. Patient also reports left hip intermittent sharp 10/10 pain.     Triage Assessment     Row Name 07/17/22 0956       Triage Assessment (Adult)    Airway WDL WDL       Respiratory WDL    Respiratory WDL WDL       Skin Circulation/Temperature WDL    Skin Circulation/Temperature WDL WDL       Cardiac WDL    Cardiac WDL WDL       Peripheral/Neurovascular WDL    Peripheral Neurovascular WDL WDL       Cognitive/Neuro/Behavioral WDL    Cognitive/Neuro/Behavioral WDL WDL

## 2022-07-17 NOTE — ED PROVIDER NOTES
[facility] EMERGENCY DEPARTMENT ([facility])  7/17/22        History     Chief Complaint   Patient presents with     Dizziness     Patient presents today with nausea, dizziness, and headaches for the past 7 days. Patient denies emesis. Patient reports only nausea at this present time. Patient also reports left hip intermittent sharp 10/10 pain.     The history is provided by the patient and medical records.     Rachel Osei is a 42 year old female with PMH significant for migraine without aura, and intramural, submucous, and subserous leiomyoma of uterus presents to the ED with nausea, dizziness, decreased appetite, and concern for UTI. Patient reports experiencing intermittent nausea, dizzyness, headaches and decreased appetite since past week. No complains of headache today. Patient also complains of sharp pain on lateral left thigh, and back pain for which she has been using biofreeze.     Patient reports foul smelling vaginal discharge, and states having unprotected intercourse a week ago. Concerned for STI. Reports never having any STIs previously. No difficulty urinating, no fever.               Past Medical History  Past Medical History:   Diagnosis Date     Intramural, submucous, and subserous leiomyoma of uterus     dx during pregnancy 11/2019     History reviewed. No pertinent surgical history.  acetaminophen (TYLENOL) 500 MG tablet  cholecalciferol (VITAMIN D3) 25 mcg (1000 units) capsule  ferrous sulfate (FEROSUL) 325 (65 Fe) MG tablet  naproxen sodium 220 MG capsule  ondansetron (ZOFRAN ODT) 4 MG ODT tab      No Known Allergies  Family History  Family History   Problem Relation Age of Onset     Hypertension Father      Social History   Social History     Tobacco Use     Smoking status: Never Smoker     Smokeless tobacco: Never Used   Substance Use Topics     Alcohol use: No     Drug use: No      Past medical history, past surgical history, medications, allergies, family history, and  "social history were reviewed with the patient. No additional pertinent items.       Review of Systems   Constitutional: Positive for appetite change (decreased) and fatigue. Negative for chills and fever.   HENT: Negative for congestion and sore throat.    Eyes: Negative for photophobia, redness and visual disturbance.   Respiratory: Negative for shortness of breath.    Cardiovascular: Negative for chest pain.   Gastrointestinal: Positive for nausea. Negative for abdominal pain and vomiting.   Endocrine: Negative for polydipsia and polyuria.   Genitourinary: Positive for vaginal discharge. Negative for difficulty urinating, dysuria, pelvic pain, vaginal bleeding and vaginal pain.   Musculoskeletal: Positive for back pain and myalgias (left lateral thigh). Negative for arthralgias and neck stiffness.   Skin: Negative for color change.   Allergic/Immunologic: Negative for immunocompromised state.   Neurological: Positive for light-headedness. Negative for numbness and headaches.   Hematological: Negative for adenopathy. Does not bruise/bleed easily.   Psychiatric/Behavioral: Negative for confusion.   All other systems reviewed and are negative.    A complete review of systems was performed with pertinent positives and negatives noted in the HPI, and all other systems negative.    Physical Exam   BP: 124/88  Pulse: 66  Temp: 98.3  F (36.8  C)  Resp: 14  Height: 170.2 cm (5' 7\")  Weight: 82.6 kg (182 lb)  SpO2: 99 %  Physical Exam  Vitals and nursing note reviewed. Exam conducted with a chaperone present.   Constitutional:       General: She is not in acute distress.     Appearance: Normal appearance. She is normal weight. She is not ill-appearing, toxic-appearing or diaphoretic.   HENT:      Head: Normocephalic and atraumatic.      Mouth/Throat:      Pharynx: No oropharyngeal exudate.   Eyes:      General: No scleral icterus.     Extraocular Movements: Extraocular movements intact.      Conjunctiva/sclera: Conjunctivae " normal.      Pupils: Pupils are equal, round, and reactive to light.   Neck:      Comments: There is no meningismus.  Cardiovascular:      Rate and Rhythm: Normal rate.      Pulses: Normal pulses.      Heart sounds: Normal heart sounds.   Pulmonary:      Effort: Pulmonary effort is normal. No respiratory distress.      Breath sounds: Normal breath sounds.   Abdominal:      Palpations: Abdomen is soft.      Tenderness: There is no abdominal tenderness.   Genitourinary:     General: Normal vulva.      Labia:         Right: No rash, tenderness, lesion or injury.         Left: No rash, tenderness, lesion or injury.       Vagina: Vaginal discharge ( mild, white) present. No tenderness or bleeding.      Cervix: No cervical motion tenderness, discharge, lesion, erythema or cervical bleeding.      Adnexa: Right adnexa normal and left adnexa normal.   Musculoskeletal:         General: No tenderness. Normal range of motion.      Cervical back: Normal range of motion and neck supple. No tenderness.      Right lower leg: No edema.   Lymphadenopathy:      Cervical: No cervical adenopathy.   Skin:     General: Skin is warm.      Findings: No rash.   Neurological:      General: No focal deficit present.      Mental Status: She is alert and oriented to person, place, and time.      Cranial Nerves: No cranial nerve deficit.      Coordination: Coordination normal.   Psychiatric:         Mood and Affect: Mood normal.         Behavior: Behavior normal.         Thought Content: Thought content normal.         Judgment: Judgment normal.         ED Course     11:18 AM  The patient was seen and examined by Aarti Farrell MD  in Room ED20.    Procedures                 Results for orders placed or performed during the hospital encounter of 07/17/22   Comprehensive metabolic panel     Status: Abnormal   Result Value Ref Range    Sodium 142 133 - 144 mmol/L    Potassium 3.8 3.4 - 5.3 mmol/L    Chloride 109 94 - 109 mmol/L    Carbon Dioxide  (CO2) 28 20 - 32 mmol/L    Anion Gap 5 3 - 14 mmol/L    Urea Nitrogen 12 7 - 30 mg/dL    Creatinine 1.06 (H) 0.52 - 1.04 mg/dL    Calcium 9.2 8.5 - 10.1 mg/dL    Glucose 88 70 - 99 mg/dL    Alkaline Phosphatase 84 40 - 150 U/L    AST 13 0 - 45 U/L    ALT 15 0 - 50 U/L    Protein Total 8.2 6.8 - 8.8 g/dL    Albumin 3.7 3.4 - 5.0 g/dL    Bilirubin Total 0.4 0.2 - 1.3 mg/dL    GFR Estimate 66 >60 mL/min/1.73m2   Lipase     Status: Normal   Result Value Ref Range    Lipase 114 73 - 393 U/L   UA with Microscopic reflex to Culture     Status: Abnormal    Specimen: Urine, Clean Catch   Result Value Ref Range    Color Urine Light Yellow Colorless, Straw, Light Yellow, Yellow    Appearance Urine Slightly Cloudy (A) Clear    Glucose Urine Negative Negative mg/dL    Bilirubin Urine Negative Negative    Ketones Urine Negative Negative mg/dL    Specific Gravity Urine 1.021 1.003 - 1.035    Blood Urine Trace (A) Negative    pH Urine 6.0 5.0 - 7.0    Protein Albumin Urine 70  (A) Negative mg/dL    Urobilinogen Urine Normal Normal, 2.0 mg/dL    Nitrite Urine Negative Negative    Leukocyte Esterase Urine Large (A) Negative    Mucus Urine Present (A) None Seen /LPF    RBC Urine 12 (H) <=2 /HPF    WBC Urine 78 (H) <=5 /HPF    Squamous Epithelials Urine 6 (H) <=1 /HPF    Transitional Epithelials Urine <1 <=1 /HPF    Narrative    Urine Culture ordered based on laboratory criteria   CBC with platelets and differential     Status: None   Result Value Ref Range    WBC Count 4.6 4.0 - 11.0 10e3/uL    RBC Count 4.48 3.80 - 5.20 10e6/uL    Hemoglobin 12.2 11.7 - 15.7 g/dL    Hematocrit 38.1 35.0 - 47.0 %    MCV 85 78 - 100 fL    MCH 27.2 26.5 - 33.0 pg    MCHC 32.0 31.5 - 36.5 g/dL    RDW 14.6 10.0 - 15.0 %    Platelet Count 236 150 - 450 10e3/uL    % Neutrophils 44 %    % Lymphocytes 46 %    % Monocytes 9 %    % Eosinophils 1 %    % Basophils 0 %    % Immature Granulocytes 0 %    NRBCs per 100 WBC 0 <1 /100    Absolute Neutrophils 2.1 1.6 -  8.3 10e3/uL    Absolute Lymphocytes 2.1 0.8 - 5.3 10e3/uL    Absolute Monocytes 0.4 0.0 - 1.3 10e3/uL    Absolute Eosinophils 0.0 0.0 - 0.7 10e3/uL    Absolute Basophils 0.0 0.0 - 0.2 10e3/uL    Absolute Immature Granulocytes 0.0 <=0.4 10e3/uL    Absolute NRBCs 0.0 10e3/uL   hCG qual urine POCT     Status: Normal   Result Value Ref Range    HCG Qual Urine Negative Negative    Internal QC Check POCT Valid Valid    POCT Kit Lot Number [id number]     POCT Kit Expiration Date 8/31/23    Wet prep     Status: Abnormal    Specimen: Vagina; Swab   Result Value Ref Range    Trichomonas Absent Absent    Yeast Absent Absent    Clue Cells Absent Absent    WBCs/high power field 2+ (A) None   CBC with platelets differential     Status: None    Narrative    The following orders were created for panel order CBC with platelets differential.  Procedure                               Abnormality         Status                     ---------                               -----------         ------                     CBC with platelets and d...[[id number]]                      Final result                 Please view results for these tests on the individual orders.     Medications   0.9% sodium chloride BOLUS (0 mLs Intravenous Stopped 7/17/22 1302)        Assessments & Plan (with Medical Decision Making)   42-year-old woman presenting with lightheadedness, nausea, decreased oral intake, and vaginal odor.  Differential diagnosis: Dehydration, electrolyte abnormalities, STI, bacterial vaginosis.    After thorough history and physical exam patient appears to be no acute distress.  I will hydrate her with a bolus of intravenous normal saline and treat her nausea with IV Zofran.  She agrees with the plan.    Laboratory studies returned negative for pregnancy.  There is no leukocytosis, WBC is normal at 4,600.  There is no anemia, hemoglobin is normal at 12.2.  Electrolytes do show evidence of dehydration with slightly elevated creatinine of  1.06.  Patient was hydrated bolus of intravenous normal saline.  LFTs and lipase are normal.  Urinalysis returned with large leukocyte esterase and elevated WBCs and RBCs, however, patient has no dysuria, frequency, or urgency to make me suspect that she has acute cystitis or urinary tract infection.  Culture is in progress.  After Emergency Department management her symptoms have improved and at this point she is stable for discharge with outpatient primary care follow-up.  She agrees with this plan and she agrees to return if her symptoms worsen.   ---  This part of the medical record was transcribed by Roderick Sahu, Medical Scribe, from a dictation done by Aarti Farrell MD.      I have reviewed the nursing notes. I have reviewed the findings, diagnosis, plan and need for follow up with the patient.    Discharge Medication List as of 7/17/2022  1:02 PM      START taking these medications    Details   ondansetron (ZOFRAN ODT) 4 MG ODT tab Take 1 tablet (4 mg) by mouth every 8 hours as needed for nausea, Disp-10 tablet, R-0, Local Print             Final diagnoses:   Lightheadedness   Nausea     IRoderick am serving as a trained medical scribe to document services personally performed by Aarti Farrell MD, based on the provider's statements to me.      I, Aarti Farrell MD, was physically present and have reviewed and verified the accuracy of this note documented by Roderick Sahu.    --  Aarti Farrell MD  [facility] EMERGENCY DEPARTMENT  7/17/2022     Aarti Farrell MD  07/17/22 6565

## 2022-07-17 NOTE — DISCHARGE INSTRUCTIONS
Please make an appointment to follow up with Your Primary Care Provider in 2 days unless symptoms completely resolve.  Please take the prescribed nausea medication, if needed.  If your symptoms significantly worsen or you develop a fever 100.4  F or higher please come back to the emergency department.

## 2022-07-18 LAB
BACTERIA UR CULT: NO GROWTH
C TRACH DNA SPEC QL NAA+PROBE: NEGATIVE
N GONORRHOEA DNA SPEC QL NAA+PROBE: NEGATIVE

## 2022-07-18 NOTE — RESULT ENCOUNTER NOTE
Final result for both N. Gonorrhoeae PCR and Chlamydia Trachomatis PCR are NEGATIVE.  No treatment or change in treatment per [facility] ED Lab Result N. Gonorrhea AND/OR Chlamydia T. protocol.

## 2022-07-18 NOTE — RESULT ENCOUNTER NOTE
"Final urine culture report shows \"NO GROWTH\" and is NEGATIVE.  [facility] Emergency Dept discharge antibiotic: None  Recommendations in treatment per [facility] ED Lab result Urine culture protocol.  "

## 2022-10-30 ENCOUNTER — HEALTH MAINTENANCE LETTER (OUTPATIENT)
Age: 42
End: 2022-10-30

## 2023-04-08 ENCOUNTER — HEALTH MAINTENANCE LETTER (OUTPATIENT)
Age: 43
End: 2023-04-08

## 2023-07-14 ENCOUNTER — NURSE TRIAGE (OUTPATIENT)
Dept: NURSING | Facility: CLINIC | Age: 43
End: 2023-07-14

## 2023-07-15 NOTE — TELEPHONE ENCOUNTER
Caller  Is contacting  [facility] ED  Caller reporting mid sternal and left sided chest pain onset  25 minutes prior ; started with shortness of breath ; described  Pain s heavinesss  Triage protocol reviewed   Advised to call  [phone]   Caller will comply   Johanna JUAREZ               Reason for Disposition    [1] Chest pain lasts > 5 minutes AND [2] age > 44    Additional Information    Negative: SEVERE difficulty breathing (e.g., struggling for each breath, speaks in single words)    Negative: Difficult to awaken or acting confused (e.g., disoriented, slurred speech)    Negative: Shock suspected (e.g., cold/pale/clammy skin, too weak to stand, low BP, rapid pulse)    Negative: Passed out (i.e., lost consciousness, collapsed and was not responding)    Protocols used: CHEST PAIN-A-AH

## 2023-11-12 ENCOUNTER — HEALTH MAINTENANCE LETTER (OUTPATIENT)
Age: 43
End: 2023-11-12

## 2024-06-07 ENCOUNTER — HOSPITAL ENCOUNTER (EMERGENCY)
Facility: CLINIC | Age: 44
Discharge: HOME | End: 2024-06-07
Attending: EMERGENCY MEDICINE | Admitting: EMERGENCY MEDICINE
Payer: MEDICAID

## 2024-06-07 VITALS
RESPIRATION RATE: 16 BRPM | TEMPERATURE: 98.5 F | HEART RATE: 56 BPM | SYSTOLIC BLOOD PRESSURE: 122 MMHG | DIASTOLIC BLOOD PRESSURE: 86 MMHG | BODY MASS INDEX: 29.25 KG/M2 | HEIGHT: 68 IN | OXYGEN SATURATION: 100 % | WEIGHT: 193 LBS

## 2024-06-07 DIAGNOSIS — S02.5XXA CLOSED FRACTURE OF TOOTH, INITIAL ENCOUNTER: ICD-10-CM

## 2024-06-07 PROCEDURE — 99284 EMERGENCY DEPT VISIT MOD MDM: CPT | Performed by: EMERGENCY MEDICINE

## 2024-06-07 PROCEDURE — 99283 EMERGENCY DEPT VISIT LOW MDM: CPT | Performed by: EMERGENCY MEDICINE

## 2024-06-07 ASSESSMENT — COLUMBIA-SUICIDE SEVERITY RATING SCALE - C-SSRS
2. HAVE YOU ACTUALLY HAD ANY THOUGHTS OF KILLING YOURSELF IN THE PAST MONTH?: NO
1. IN THE PAST MONTH, HAVE YOU WISHED YOU WERE DEAD OR WISHED YOU COULD GO TO SLEEP AND NOT WAKE UP?: NO
6. HAVE YOU EVER DONE ANYTHING, STARTED TO DO ANYTHING, OR PREPARED TO DO ANYTHING TO END YOUR LIFE?: NO

## 2024-06-07 ASSESSMENT — ACTIVITIES OF DAILY LIVING (ADL): ADLS_ACUITY_SCORE: 33

## 2024-06-07 NOTE — ED TRIAGE NOTES
Patient presents due to chipped right upper molar. Patient understands she should be seen by a dentist, however she is concerned about infection and pain. Patient would like to get something done until she can be seen by dentist. Patient reports 8/10 throbbing pain to right upper jaw.     Triage Assessment (Adult)       Row Name 06/07/24 1225          Triage Assessment    Airway WDL WDL        Respiratory WDL    Respiratory WDL WDL        Skin Circulation/Temperature WDL    Skin Circulation/Temperature WDL WDL        Cardiac WDL    Cardiac WDL WDL        Peripheral/Neurovascular WDL    Peripheral Neurovascular WDL WDL        Cognitive/Neuro/Behavioral WDL    Cognitive/Neuro/Behavioral WDL WDL

## 2024-06-07 NOTE — ED PROVIDER NOTES
"ED Provider Note  [facility]      History     Chief Complaint   Patient presents with    Dental Pain     Patient presents due to chipped right upper molar. Patient understands she should be seen by a dentist, however she is concerned about infection and pain. Patient would like to get something done until she can be seen by dentist. Patient reports 8/10 throbbing pain to right upper jaw.     ALONZO Osei is a 44 year old female who presents to the ED for evaluation of right upper dental pain.     Patient states that about a week ago, she noticed that there was a \"lump\" on the roof of her mouth, which she suspected was a burn from drinking coffee.  She used salt water gargles and Listerine and the lump went away.  2 days ago, she was eating a hard shell taco, and she felt her right upper molar crumble.  She did not swallow this, she spit it out.  She was unsure of any poor dentition in that region previously, and did not have any pain prior.  Since then, her pain has been a 7 out of 10, mild throbbing.  When she takes Advil, it reduces to a 0 out of 10.  She has not taken any Advil since yesterday morning.  She came in today to make sure that there was no infection.    The last time she saw a dentist was approximately 2 years ago.  At that time she lost her job and did not have any insurance, so she has not seen one since.  She has also had additional stressors, her brother passed away.  She is otherwise in her normal state of health.  Denies any fevers, swelling, difficulty with swallowing or talking.  She is able to eat and drink without any difficulty.        Past Medical History  Past Medical History:   Diagnosis Date    Intramural, submucous, and subserous leiomyoma of uterus     dx during pregnancy 11/2019     No past surgical history on file.  acetaminophen (TYLENOL) 500 MG tablet  cholecalciferol (VITAMIN D3) 25 mcg (1000 units) capsule  ferrous sulfate (FEROSUL) 325 " "(65 Fe) MG tablet  naproxen sodium 220 MG capsule      No Known Allergies  Family History  Family History   Problem Relation Age of Onset    Hypertension Father      Social History   Social History     Tobacco Use    Smoking status: Never    Smokeless tobacco: Never   Substance Use Topics    Alcohol use: No    Drug use: No      A medically appropriate review of systems was performed with pertinent positives and negatives noted in the HPI, and all other systems negative.    Physical Exam   BP: (!) 142/83  Pulse: 56  Temp: 98.5  F (36.9  C)  Resp: 18  Height: 171.5 cm (5' 7.5\")  Weight: 87.5 kg (193 lb)  SpO2: 100 %  Physical Exam  General: no acute distress.    HENT: Normocephalic and atraumatic. Trachea midline. Normal voice.  No facial asymmetry.  Right upper second molar with Packer class I dental fracture.  No dentin or pulp identified.  Minimal tenderness to palpation of the tooth with pressure.  No gingival swelling, tenderness, erythema.  No neck swelling, lymphadenopathy, or difficult with ROM.  Eyes: EOMI, conjunctivae normal.    Cardiovascular:  Normal rate and regular rhythm.   No murmur heard.  Radial pulses 2+ bilaterally.  Pulmonary:  No respiratory distress. Normal breath sounds bilaterally.  Abdominal: no distension.  Abdomen is soft. There is no mass. There is no abdominal tenderness.  Musculoskeletal:    Moving all extremities spontaneously.  No cyanosis, clubbing, or edema.  Skin: Warm, dry, and well perfused.   Neurological:  No focal deficit present.    Psychiatric:   The patient is awake, alert.  Appropriate mood and affect.    ED Course, Procedures, & Data      Procedures                No results found for any visits on 06/07/24.  Medications - No data to display  Labs Ordered and Resulted from Time of ED Arrival to Time of ED Departure - No data to display  No orders to display          Critical care was not performed.     Medical Decision Making  The patient's presentation was of moderate " complexity (an acute complicated injury).  The patient's evaluation involved:  review of external note(s) from 3+ sources (see separate area of note for details)  review of 3+ test result(s) ordered prior to this encounter (see separate area of note for details)    The patient's management necessitated moderate risk (a decision regarding minor procedure (dental filing) with risk factors of bleeding, pain, infection).    Assessment & Plan    Patient presents emergency department for right upper second molar dental fracture, which occurred 2 days ago.  Pain controlled with Advil.  On exam she is afebrile and in no acute distress.  She appears well-hydrated.  Right upper second molar with Packer type I fracture, exposure of enamel only, mildly tender to palpation.  No evidence of periapical abscess, deep space infection, pulpitis, subluxation, trench mouth.    She denies that the dentition feels sharp or bothering her buccal mucosa, thus no need for filing.  Patient was offered Advil in the emergency department, however she prefers to take her home medication as that works very well.  Discussed a dental block, which patient deferred.  I provided reassurance that there was no active infection, however discussed return precautions.  I placed a dental referral, in addition provided her phone numbers for other dental clinics, that might be able to see her earlier.    I have reviewed the nursing notes. I have reviewed the findings, diagnosis, plan and need for follow up with the patient.    New Prescriptions    No medications on file       Final diagnoses:   None         [facility] EMERGENCY DEPARTMENT  6/7/2024     Anna Parikh MD  06/07/24 2357

## 2024-06-07 NOTE — DISCHARGE INSTRUCTIONS
As discussed, use the Advil as directed for pain.  You can use ice packs as well.  If you have rapid swelling, drainage, severe pain uncontrolled with Advil and Tylenol return to the emergency department for reevaluation.  A dental referral was placed, a  will call you either today or next week to make an appointment, however I recommend calling the phone numbers below as someone may be able to see you sooner.    Many of these clinics offer a sliding fee option for patients that qualify, and see patients on a walk-in or same day basis. Please call each clinic directly. As services, hours, fees and policies vary greatly.    [place]:  [facility]     [phone]  [facility][1] ([facility]) [phone]  [facility]  [phone]  [facility]      [phone]   [facility]    [phone]  [facility]  [phone]  [facility] [phone]  [facility]     [phone]  [facility]   [phone]  [facility] (cash only)   [phone]  [facility]    [phone] (adults)          [phone] (children)    [place]:  [facility]     [phone]; [phone]  [facility]     [phone]  [facility]     [phone]  [facility] (free, limited)    [phone]    Multiple Locations:  [facility]       [phone]

## 2024-06-26 ENCOUNTER — OFFICE VISIT (OUTPATIENT)
Dept: FAMILY MEDICINE | Facility: CLINIC | Age: 44
End: 2024-06-26
Payer: MEDICAID

## 2024-06-26 VITALS
OXYGEN SATURATION: 97 % | DIASTOLIC BLOOD PRESSURE: 73 MMHG | BODY MASS INDEX: 29.1 KG/M2 | HEART RATE: 85 BPM | SYSTOLIC BLOOD PRESSURE: 104 MMHG | TEMPERATURE: 98.4 F | WEIGHT: 192 LBS | HEIGHT: 68 IN | RESPIRATION RATE: 16 BRPM

## 2024-06-26 DIAGNOSIS — D25.9 UTERINE LEIOMYOMA, UNSPECIFIED LOCATION: Primary | ICD-10-CM

## 2024-06-26 DIAGNOSIS — R06.02 SHORTNESS OF BREATH: ICD-10-CM

## 2024-06-26 DIAGNOSIS — F41.1 GAD (GENERALIZED ANXIETY DISORDER): ICD-10-CM

## 2024-06-26 DIAGNOSIS — N76.0 VAGINITIS AND VULVOVAGINITIS: ICD-10-CM

## 2024-06-26 LAB
CLUE CELLS: PRESENT
ERYTHROCYTE [DISTWIDTH] IN BLOOD BY AUTOMATED COUNT: 15.7 % (ref 10–15)
HCT VFR BLD AUTO: 35.3 % (ref 35–47)
HGB BLD-MCNC: 11.1 G/DL (ref 11.7–15.7)
MCH RBC QN AUTO: 26.2 PG (ref 26.5–33)
MCHC RBC AUTO-ENTMCNC: 31.4 G/DL (ref 31.5–36.5)
MCV RBC AUTO: 83 FL (ref 78–100)
PLATELET # BLD AUTO: 302 10E3/UL (ref 150–450)
RBC # BLD AUTO: 4.24 10E6/UL (ref 3.8–5.2)
TRICHOMONAS, WET PREP: ABNORMAL
WBC # BLD AUTO: 6 10E3/UL (ref 4–11)
WBC'S/HIGH POWER FIELD, WET PREP: ABNORMAL
YEAST, WET PREP: ABNORMAL

## 2024-06-26 PROCEDURE — 36415 COLL VENOUS BLD VENIPUNCTURE: CPT | Performed by: FAMILY MEDICINE

## 2024-06-26 PROCEDURE — 84443 ASSAY THYROID STIM HORMONE: CPT | Performed by: FAMILY MEDICINE

## 2024-06-26 PROCEDURE — 99214 OFFICE O/P EST MOD 30 MIN: CPT | Performed by: FAMILY MEDICINE

## 2024-06-26 PROCEDURE — 85027 COMPLETE CBC AUTOMATED: CPT | Performed by: FAMILY MEDICINE

## 2024-06-26 PROCEDURE — 87210 SMEAR WET MOUNT SALINE/INK: CPT | Performed by: FAMILY MEDICINE

## 2024-06-26 NOTE — PROGRESS NOTES
"  Assessment & Plan     Uterine leiomyoma, unspecified location  Hx of uterine fibroids, with increased menstrual bleeding. Plan labs and US as below to re-evaluate. Briefly discussed options for medical and/or surgical management of her symptoms.   - CBC with platelets  - TSH with free T4 reflex  - US Pelvis Complete without Transvaginal - In Clinic  - CBC with platelets  - TSH with free T4 reflex    Vaginitis and vulvovaginitis  Recent self-treatment for BV with OTC product with return of symptoms. Will check wet prep and treat accordingly. Discussed hygiene at home to avoid recurrent symptoms.   - Wet preparation - Clinic Collect    YRIS (generalized anxiety disorder)  Suspect her intermittent chest symptoms are related to anxiety (which she agrees with). Referral placed for therapy.   - Adult Mental Health  Referral    Shortness of breath  Will check labs. No hx of asthma. No signs of cardiac etiology by exam or history.   - CBC with platelets  - TSH with free T4 reflex  - CBC with platelets  - TSH with free T4 reflex            BMI  Estimated body mass index is 29.63 kg/m  as calculated from the following:    Height as of this encounter: 1.715 m (5' 7.5\").    Weight as of this encounter: 87.1 kg (192 lb).             Return in about 4 weeks (around 7/24/2024) for Annual Physical.    Nathan Ramos is a 44 year old, presenting for the following health issues:  Consult (Requesting a physical or general check in. Requesting Pap, blood testing ) and Chest Pain (Shortness of breath. Waking up at night. Unsure if it's related to anxiety. )      6/26/2024     3:30 PM   Additional Questions   Roomed by Marilee   Accompanied by self         6/26/2024    Information    services provided? No        HPI                 1) Shortness of breath  Ongoing for a couple years but \"I have been ignoring it\"   Comes out of nowhere.   Worse with laying on back  Intermittent  Thinking about therapy " "  Occurs about every couple months    2) Recurrent BV symptoms  - last was before her last pregnancy   - Since April, more symptoms. Tried OTC treatment with AZO (odor improved).   Currently having clear yellow discharge  Did just finish menses.     3) Fibroid hx  Menses has been different recently; usually last 7 days (heavy for 2-3 days and then lighter)  Has been lasting longer (more like 10 days).   Last 2 months, it seemed to improve   Wondering if this is perimenopause        Objective    /73   Pulse 85   Temp 98.4  F (36.9  C) (Oral)   Resp 16   Ht 1.715 m (5' 7.5\")   Wt 87.1 kg (192 lb)   LMP 05/27/2024   SpO2 97%   BMI 29.63 kg/m    Body mass index is 29.63 kg/m .  Physical Exam  Constitutional:       General: She is not in acute distress.  HENT:      Head: Normocephalic and atraumatic.   Eyes:      Conjunctiva/sclera: Conjunctivae normal.   Cardiovascular:      Rate and Rhythm: Normal rate and regular rhythm.      Heart sounds: Normal heart sounds.   Pulmonary:      Effort: Pulmonary effort is normal.      Breath sounds: Normal breath sounds.   Musculoskeletal:      Cervical back: Neck supple.   Skin:     General: Skin is warm and dry.   Neurological:      Mental Status: She is alert and oriented to person, place, and time.   Psychiatric:         Judgment: Judgment normal.                Signed Electronically by: Berenice Arndt DO    "

## 2024-06-26 NOTE — PATIENT INSTRUCTIONS
Patient Education   Here is the plan from today's visit    1. Uterine leiomyoma, unspecified location  - CBC with platelets; Future  - TSH with free T4 reflex; Future  - US Pelvis Complete without Transvaginal - In Clinic; Future    2. Vaginitis and vulvovaginitis  - Wet preparation - Clinic Collect    3. YRIS (generalized anxiety disorder)  - Adult Mental Health [facility] Referral; Future    4. Shortness of breath  - CBC with platelets; Future  - TSH with free T4 reflex; Future    Please call or return to clinic if your symptoms don't go away.    Follow up plan  Return in about 4 weeks (around 7/24/2024) for Annual Physical.    Thank you for coming to [facility] today.  Lab Testing:  **If you had lab testing today and your results are reassuring or normal they will be mailed to you or sent through [employer] within 7 days.   **If the lab tests need quick action we will call you with the results.  **If you are having labs done on a different day, please call [phone] to schedule at [facility] or [phone] for other [facility] Outpatient Lab locations. Labs do not offer walk-in appointments.  The phone number we will call with results is # [phone] (home) . If this is not the best number please call our clinic and change the number.  Medication Refills:  If you need any refills please call your pharmacy and they will contact us.   If you need to  your refill at a new pharmacy, please contact the new pharmacy directly. The new pharmacy will help you get your medications transferred faster.   Scheduling:  If you have any concerns about today's visit or wish to schedule another appointment please call our office during normal business hours [phone] (8-5:00 M-F). If you can no longer make a scheduled visit, please cancel via [employer] or call us to cancel.   If a referral was made to an [facility] specialty provider and you do not get a call from central scheduling, please refer to  directions on your visit summary or call our office during normal business hours for assistance.   If a Mammogram was ordered for you at the Breast Center call [phone] to schedule or change your appointment.  If you had an XRay/CT/Ultrasound/MRI ordered the number is [phone] to schedule or change your radiology appointment.   [facility] has limited ultrasound appointments available on Wednesdays, if you would like your ultrasound at [facility], please call [phone] to schedule.   Medical Concerns:  If you have urgent medical concerns please call [phone] at any time of the day.    Berenice Arndt, DO

## 2024-06-27 ENCOUNTER — MYC MEDICAL ADVICE (OUTPATIENT)
Dept: FAMILY MEDICINE | Facility: CLINIC | Age: 44
End: 2024-06-27
Payer: MEDICAID

## 2024-06-27 DIAGNOSIS — N76.0 VAGINITIS AND VULVOVAGINITIS: Primary | ICD-10-CM

## 2024-06-27 DIAGNOSIS — D50.0 IRON DEFICIENCY ANEMIA DUE TO CHRONIC BLOOD LOSS: ICD-10-CM

## 2024-06-27 LAB — TSH SERPL DL<=0.005 MIU/L-ACNC: 0.55 UIU/ML (ref 0.3–4.2)

## 2024-06-28 RX ORDER — METRONIDAZOLE 500 MG/1
500 TABLET ORAL 2 TIMES DAILY
Qty: 14 TABLET | Refills: 0 | Status: SHIPPED | OUTPATIENT
Start: 2024-06-28 | End: 2024-07-05

## 2024-06-28 RX ORDER — FERROUS SULFATE 325(65) MG
325 TABLET ORAL EVERY OTHER DAY
Qty: 45 TABLET | Refills: 3 | Status: SHIPPED | OUTPATIENT
Start: 2024-06-28

## 2024-07-10 ENCOUNTER — HOSPITAL ENCOUNTER (EMERGENCY)
Facility: CLINIC | Age: 44
Discharge: HOME | End: 2024-07-11
Attending: EMERGENCY MEDICINE | Admitting: EMERGENCY MEDICINE
Payer: MEDICAID

## 2024-07-10 DIAGNOSIS — R07.9 CHEST PAIN IN ADULT: ICD-10-CM

## 2024-07-10 DIAGNOSIS — Z11.3 SCREENING FOR STDS (SEXUALLY TRANSMITTED DISEASES): ICD-10-CM

## 2024-07-10 DIAGNOSIS — M62.830 SPASM OF BOTH TRAPEZIUS MUSCLES: ICD-10-CM

## 2024-07-10 PROCEDURE — 99284 EMERGENCY DEPT VISIT MOD MDM: CPT | Mod: 25 | Performed by: EMERGENCY MEDICINE

## 2024-07-10 PROCEDURE — 99285 EMERGENCY DEPT VISIT HI MDM: CPT | Performed by: EMERGENCY MEDICINE

## 2024-07-10 PROCEDURE — 93005 ELECTROCARDIOGRAM TRACING: CPT | Performed by: EMERGENCY MEDICINE

## 2024-07-10 PROCEDURE — 93010 ELECTROCARDIOGRAM REPORT: CPT | Performed by: EMERGENCY MEDICINE

## 2024-07-10 ASSESSMENT — COLUMBIA-SUICIDE SEVERITY RATING SCALE - C-SSRS
2. HAVE YOU ACTUALLY HAD ANY THOUGHTS OF KILLING YOURSELF IN THE PAST MONTH?: NO
6. HAVE YOU EVER DONE ANYTHING, STARTED TO DO ANYTHING, OR PREPARED TO DO ANYTHING TO END YOUR LIFE?: NO
1. IN THE PAST MONTH, HAVE YOU WISHED YOU WERE DEAD OR WISHED YOU COULD GO TO SLEEP AND NOT WAKE UP?: NO

## 2024-07-11 ENCOUNTER — APPOINTMENT (OUTPATIENT)
Dept: GENERAL RADIOLOGY | Facility: CLINIC | Age: 44
End: 2024-07-11
Attending: FAMILY MEDICINE
Payer: MEDICAID

## 2024-07-11 ENCOUNTER — OFFICE VISIT (OUTPATIENT)
Dept: FAMILY MEDICINE | Facility: CLINIC | Age: 44
End: 2024-07-11
Payer: MEDICAID

## 2024-07-11 VITALS
HEART RATE: 68 BPM | SYSTOLIC BLOOD PRESSURE: 125 MMHG | DIASTOLIC BLOOD PRESSURE: 65 MMHG | OXYGEN SATURATION: 99 % | TEMPERATURE: 97.9 F | RESPIRATION RATE: 16 BRPM

## 2024-07-11 VITALS
SYSTOLIC BLOOD PRESSURE: 114 MMHG | RESPIRATION RATE: 14 BRPM | WEIGHT: 194.5 LBS | BODY MASS INDEX: 29.48 KG/M2 | HEIGHT: 68 IN | HEART RATE: 75 BPM | DIASTOLIC BLOOD PRESSURE: 76 MMHG | OXYGEN SATURATION: 98 % | TEMPERATURE: 98.5 F

## 2024-07-11 DIAGNOSIS — Z11.3 SCREENING FOR STDS (SEXUALLY TRANSMITTED DISEASES): ICD-10-CM

## 2024-07-11 DIAGNOSIS — D64.9 ANEMIA, UNSPECIFIED TYPE: ICD-10-CM

## 2024-07-11 DIAGNOSIS — Z12.4 CERVICAL CANCER SCREENING: Primary | ICD-10-CM

## 2024-07-11 LAB
ALBUMIN SERPL BCG-MCNC: 4 G/DL (ref 3.5–5.2)
ALP SERPL-CCNC: 79 U/L (ref 40–150)
ALT SERPL W P-5'-P-CCNC: 6 U/L (ref 0–50)
ANION GAP SERPL CALCULATED.3IONS-SCNC: 8 MMOL/L (ref 7–15)
AST SERPL W P-5'-P-CCNC: 13 U/L (ref 0–45)
BASOPHILS # BLD AUTO: 0 10E3/UL (ref 0–0.2)
BASOPHILS NFR BLD AUTO: 0 %
BILIRUB SERPL-MCNC: 0.2 MG/DL
BUN SERPL-MCNC: 12.4 MG/DL (ref 6–20)
CALCIUM SERPL-MCNC: 9 MG/DL (ref 8.6–10)
CHLORIDE SERPL-SCNC: 104 MMOL/L (ref 98–107)
CREAT SERPL-MCNC: 1.08 MG/DL (ref 0.51–0.95)
DEPRECATED HCO3 PLAS-SCNC: 25 MMOL/L (ref 22–29)
EGFRCR SERPLBLD CKD-EPI 2021: 64 ML/MIN/1.73M2
EOSINOPHIL # BLD AUTO: 0.1 10E3/UL (ref 0–0.7)
EOSINOPHIL NFR BLD AUTO: 2 %
ERYTHROCYTE [DISTWIDTH] IN BLOOD BY AUTOMATED COUNT: 16.3 % (ref 10–15)
GLUCOSE SERPL-MCNC: 94 MG/DL (ref 70–99)
HCG SERPL QL: NEGATIVE
HCT VFR BLD AUTO: 32.6 % (ref 35–47)
HGB BLD-MCNC: 10.6 G/DL (ref 11.7–15.7)
HIV 1+2 AB+HIV1 P24 AG SERPL QL IA: NONREACTIVE
IMM GRANULOCYTES # BLD: 0 10E3/UL
IMM GRANULOCYTES NFR BLD: 0 %
LYMPHOCYTES # BLD AUTO: 2.6 10E3/UL (ref 0.8–5.3)
LYMPHOCYTES NFR BLD AUTO: 38 %
MCH RBC QN AUTO: 27 PG (ref 26.5–33)
MCHC RBC AUTO-ENTMCNC: 32.5 G/DL (ref 31.5–36.5)
MCV RBC AUTO: 83 FL (ref 78–100)
MONOCYTES # BLD AUTO: 0.5 10E3/UL (ref 0–1.3)
MONOCYTES NFR BLD AUTO: 8 %
NEUTROPHILS # BLD AUTO: 3.5 10E3/UL (ref 1.6–8.3)
NEUTROPHILS NFR BLD AUTO: 52 %
NRBC # BLD AUTO: 0 10E3/UL
NRBC BLD AUTO-RTO: 0 /100
PLATELET # BLD AUTO: 272 10E3/UL (ref 150–450)
POTASSIUM SERPL-SCNC: 3.7 MMOL/L (ref 3.4–5.3)
PROT SERPL-MCNC: 7 G/DL (ref 6.4–8.3)
RBC # BLD AUTO: 3.92 10E6/UL (ref 3.8–5.2)
SODIUM SERPL-SCNC: 137 MMOL/L (ref 135–145)
TROPONIN T SERPL HS-MCNC: <6 NG/L
TROPONIN T SERPL HS-MCNC: <6 NG/L
WBC # BLD AUTO: 6.7 10E3/UL (ref 4–11)

## 2024-07-11 PROCEDURE — 87491 CHLMYD TRACH DNA AMP PROBE: CPT

## 2024-07-11 PROCEDURE — 36415 COLL VENOUS BLD VENIPUNCTURE: CPT | Performed by: EMERGENCY MEDICINE

## 2024-07-11 PROCEDURE — 36415 COLL VENOUS BLD VENIPUNCTURE: CPT | Performed by: FAMILY MEDICINE

## 2024-07-11 PROCEDURE — 84484 ASSAY OF TROPONIN QUANT: CPT | Performed by: EMERGENCY MEDICINE

## 2024-07-11 PROCEDURE — 71046 X-RAY EXAM CHEST 2 VIEWS: CPT

## 2024-07-11 PROCEDURE — 87591 N.GONORRHOEAE DNA AMP PROB: CPT

## 2024-07-11 PROCEDURE — 88175 CYTOPATH C/V AUTO FLUID REDO: CPT

## 2024-07-11 PROCEDURE — 80053 COMPREHEN METABOLIC PANEL: CPT | Performed by: FAMILY MEDICINE

## 2024-07-11 PROCEDURE — 87624 HPV HI-RISK TYP POOLED RSLT: CPT

## 2024-07-11 PROCEDURE — 85025 COMPLETE CBC W/AUTO DIFF WBC: CPT | Performed by: FAMILY MEDICINE

## 2024-07-11 PROCEDURE — 84703 CHORIONIC GONADOTROPIN ASSAY: CPT | Performed by: EMERGENCY MEDICINE

## 2024-07-11 PROCEDURE — 84484 ASSAY OF TROPONIN QUANT: CPT | Performed by: FAMILY MEDICINE

## 2024-07-11 PROCEDURE — 99213 OFFICE O/P EST LOW 20 MIN: CPT | Mod: GC

## 2024-07-11 PROCEDURE — 87389 HIV-1 AG W/HIV-1&-2 AB AG IA: CPT

## 2024-07-11 RX ORDER — KETOROLAC TROMETHAMINE 15 MG/ML
15 INJECTION, SOLUTION INTRAMUSCULAR; INTRAVENOUS ONCE
Status: DISCONTINUED | OUTPATIENT
Start: 2024-07-11 | End: 2024-07-11

## 2024-07-11 ASSESSMENT — ACTIVITIES OF DAILY LIVING (ADL)
ADLS_ACUITY_SCORE: 35

## 2024-07-11 NOTE — PATIENT INSTRUCTIONS
Patient Education   Here is the plan from today's visit    1. Cervical cancer screening  - Gynecologic Cytology (Pap) and HPV - Recommended Age 30-65 Years    2. Screening for STDs (sexually transmitted diseases)  - Neisseria gonorrhoeae PCR; Future  - Chlamydia trachomatis PCR; Future  - Treponema Abs w Reflex to RPR and Titer; Future  - HIV Antigen Antibody Combo; Future          Please call or return to clinic if your symptoms don't go away.    Follow up plan  Return for Routine preventive.    Thank you for coming to [facility] today.  Lab Testing:  **If you had lab testing today and your results are reassuring or normal they will be mailed to you or sent through [employer] within 7 days.   **If the lab tests need quick action we will call you with the results.  **If you are having labs done on a different day, please call [phone] to schedule at [facility] or [phone] for other [facility] Outpatient Lab locations. Labs do not offer walk-in appointments.  The phone number we will call with results is # [phone] (home) . If this is not the best number please call our clinic and change the number.  Medication Refills:  If you need any refills please call your pharmacy and they will contact us.   If you need to  your refill at a new pharmacy, please contact the new pharmacy directly. The new pharmacy will help you get your medications transferred faster.   Scheduling:  If you have any concerns about today's visit or wish to schedule another appointment please call our office during normal business hours [phone] (8-5:00 M-F). If you can no longer make a scheduled visit, please cancel via [employer] or call us to cancel.   If a referral was made to an [facility] specialty provider and you do not get a call from central scheduling, please refer to directions on your visit summary or call our office during normal business hours for assistance.   If a Mammogram was ordered for you at  the Breast Center call [phone] to schedule or change your appointment.  If you had an XRay/CT/Ultrasound/MRI ordered the number is [phone] to schedule or change your radiology appointment.   [facility] has limited ultrasound appointments available on Wednesdays, if you would like your ultrasound at [facility], please call [phone] to schedule.   Medical Concerns:  If you have urgent medical concerns please call [phone] at any time of the day.    Kristi Hawkins MD

## 2024-07-11 NOTE — LETTER
July 16, 2024      Rachel Osei  [address] [facility] [id number]        Dear Rachel,    Thank you for getting your care at [facility]. Please see below for your test results.    Resulted Orders   Gynecologic Cytology (Pap) and HPV - Recommended Age 30-65 Years   Result Value Ref Range    Human Papilloma Virus 16 DNA Negative Negative    Human Papilloma Virus 18 DNA Negative Negative    Human Papilloma Virus Other Negative Negative    FINAL DIAGNOSIS       This patient's sample is negative for high risk HPV DNA.          METHODOLOGY: The [employer] system uses automated extraction, simultaneous amplification of HPV (E6/E7 oncogenes) and beta-globin, followed by real time detection of fluorescent labeled HPV and beta globin using specific oligonucleotide probes. The test specifically identifies types HPV 16 DNA and HPV 18 DNA while concurrently detecting the rest of the high risk types (31, 33, 35, 39, 45, 51, 52, 56, 58, 59, 66 or 68).     COMMENTS: This test is not intended for use as a screening device for woman under age 30 with normal cervical cytology. Results should be correlated with cytologic and histologic findings. Close clinical follow up is recommended.       Chlamydia trachomatis PCR   Result Value Ref Range    Chlamydia trachomatis Negative Negative      Comment:      A negative result by transcription mediated amplification does not preclude the presence of C. trachomatis infection because results are dependent on proper and adequate collection, absence of inhibitors and sufficient rRNA to be detected.   Neisseria gonorrhoeae PCR   Result Value Ref Range    Neisseria gonorrhoeae Negative Negative      Comment:      Negative for N. gonorrhoeae rRNA by transcription mediated amplification. A negative result by transcription mediated amplification does not preclude the presence of C. trachomatis infection because results are dependent on proper and adequate collection, absence of  inhibitors and sufficient rRNA to be detected.       {[facility] FOLLOW UP LTR:[id number]}    Sincerely,    Kristi Hawkins MD

## 2024-07-11 NOTE — DISCHARGE INSTRUCTIONS
Instructions from your doctor today:  Emergency Department (ED) testing is focused on the potential causes of your symptoms that are the most dangerous possibilities, and cannot cover every possibility. Based on the evaluation, it was deemed sufficiently safe to discharge and continue management through the clinics. Thus, follow-up is very important to assess for improvement/worsening, potential further testing, and potential treatment adjustments. If you were given opioid pain medications or other medications that can make you drowsy while in the ED, you should not drive for at least several hours and not until you feel completely back to normal.     Please make an appointment to follow up with:  - Your Primary Care Provider in 2-5 days  - If you do not have a primary care provider, you can be seen in follow-up and establish care by calling any of the clinics below:     - Primary Care Center (phone: [phone])     - Primary Care / [facility] (phone: [phone])   - Have your clinic provider review the results from today's visit with you again, including any potential follow-up or additional testing that may be needed based on the results. Occasionally, incidental findings are found on later review by radiologists that may need follow-up.     Return to the Emergency Department immediately if you have worsening symptoms, or any other urgent health concerns.

## 2024-07-11 NOTE — PROGRESS NOTES
Assessment & Plan     Rachel Osei presented to the ED last night (7/10/24-7/11/24) and the results of the diagnostic testing performed in the ED were reviewed with the patient during her clinic visit today.   - Counseled patient that creatine is a measure of renal function and reassured the patient that the other measure of renal function (GFR) was WNL. Suspect isolated slight elevation of serum creatinine (1.08 mg/dL) is associated with the patient's reported decreased fluid intake. Encouraged the patient to carry a water bottle with her during the day, preferably one with volume markings so she can keep track of her water intake.     Rachel Osei presented to clinic day for cervical cancer screening and STI screening.     1. Cervical cancer screening  Last PAP testing (8/21/19) at [facility] negative for intraepithelial lesion or malignancy. HPV high risk type 16 and type 18 was not detected on PCR (8/21/19) and HPV high risk types other than 16/18 was not detected (8/21/19).   - Gynecologic Cytology (Pap) and HPV co-testing collect on examination today. Will contact the patient once all results are available.      2. Screening for STDs (sexually transmitted diseases)  Patient is requesting STI screening today. Will contact the patient once all results are available.   - Treponema Abs w Reflex to RPR and Titer added to specimen collected in the ED  - HIV Antigen Antibody Combo added to specimen collected in the ED  - Chlamydia trachomatis PCR  - Neisseria gonorrhoeae PCR    3. Anemia; unspecified type  Hemoglobin obtained in the ED last night is decreased from previous (10.6 g/dL down from 11.1 g/dL two weeks ago). She has not had an interval menstrual period. She has a history of uterine fibroids and at 6/24/24 visit with Dr. Arndt she reported a recent change in her menstrual periods and that they can last for 10 days.   - We discussed her inconsistent use of OTC iron supplements since that  "visit. Reinforced the previous recommendation that she take the iron supplement every other day to optimize absorption. The patient has been taking the iron tablet in the evening and we discussed switching to the morning so she could take it with breakfast as part of her regular routine. We will revisit this at her yearly preventative visit that she is schedule for next week to see if this switch has helped her take the supplement more consistently.     BMI  Estimated body mass index is 30.01 kg/m  as calculated from the following:    Height as of this encounter: 1.715 m (5' 7.5\").    Weight as of this encounter: 88.2 kg (194 lb 8 oz).     Follow up:   Return in about 1 week (around 7/18/2024) for Routine preventive.    Nathan Ramos is a 44 year old, presenting for the following health issues:  Gyn Exam (Pap smear )      7/11/2024     8:13 AM   Additional Questions   Roomed by Nickolas         7/11/2024    Information    services provided? No        HPI   Rachel Osei presents to clinic today for pap smear and HPV co-testing. She is also requesting STI screening today. She reports that she was recently treated for BV and had one episode of burning with urination and is wondering if she should be tested for a UTI.     The patient went to the ED last night for aching chest pain and headache. Her pain resolved while she was in the ED. She was reassured by the diagnostic testing she received in the ED, except for the elevated creatinine (1.08 mg/dL) and low hemoglobin (10.6 g/dL). She is worried that the \"kidney lab\" indicates she has a kidney infection. She notes that she has not been keeping herself hydrated recently because she is so busy.  She thinks that the low hemoglobin might be related frequently forgetting to take her iron supplement that Dr. Arndt recommended after her last visit in June. She has been taking the iron supplement at night. She cannot recall the exact date of her LMP " "but estimates it was 3 weeks ago.       Review of Systems   Constitutional:  Negative for activity change, chills and fever.   Respiratory:  Negative for shortness of breath.    Cardiovascular:  Negative for chest pain and leg swelling.   Gastrointestinal:  Negative for diarrhea and nausea.   Genitourinary:  Positive for dysuria (1 episode). Negative for decreased urine volume, difficulty urinating, frequency, hematuria and urgency.   Neurological:  Negative for headaches.   Psychiatric/Behavioral:  The patient is nervous/anxious.    All other review of symptoms was otherwise negative except as noted in HPI        Objective    /76   Pulse 75   Temp 98.5  F (36.9  C) (Oral)   Resp 14   Ht 1.715 m (5' 7.5\")   Wt 88.2 kg (194 lb 8 oz)   LMP 05/27/2024 (Exact Date)   SpO2 98%   BMI 30.01 kg/m    Body mass index is 30.01 kg/m .  Physical Exam  Vital signs reviewed  Exam conducted with Dr. Tapia. Patient declined to have an additional chaperone present.   GENERAL: awake, alert, cooperative, and in no distress  EYES: Sclera without injection, EOM intact   RESPIRATORY: Normal pulmonary effort without coughing or wheezing  CVS: no lower extremity edema    (female): normal female external genitalia; normal urethral meatus; vaginal mucosa pink, moist, well rugated; vaginal discharge - copious, watery, and milky; milky cervical discharge; cervical friability  MS: no obvious joint or extremity deformity, ROM grossly intact  PSYCH: mentation appears normal, appropriate affect, mood, insight, and through processes      Diagnostic test results and labs reviewed in Epic:    EKG (7/11/24): sinus bradycardia    CXR: \"IMPRESSION: Negative chest.\"     CBC RESULTS:   Recent Labs   Lab Test 07/11/24  0050   WBC 6.7   RBC 3.92   HGB 10.6*   HCT 32.6*   MCV 83   MCH 27.0   MCHC 32.5   RDW 16.3*        Comprehensive Metabolic Panel:  Sodium   Date Value Ref Range Status   07/11/2024 137 135 - 145 mmol/L Final "   08/01/2019 137 133 - 144 mmol/L Final     Potassium   Date Value Ref Range Status   07/11/2024 3.7 3.4 - 5.3 mmol/L Final   07/17/2022 3.8 3.4 - 5.3 mmol/L Final   08/01/2019 3.7 3.4 - 5.3 mmol/L Final     Chloride   Date Value Ref Range Status   07/11/2024 104 98 - 107 mmol/L Final   07/17/2022 109 94 - 109 mmol/L Final   08/01/2019 107 94 - 109 mmol/L Final     Carbon Dioxide   Date Value Ref Range Status   08/01/2019 25 20 - 32 mmol/L Final     Carbon Dioxide (CO2)   Date Value Ref Range Status   07/11/2024 25 22 - 29 mmol/L Final   07/17/2022 28 20 - 32 mmol/L Final     Anion Gap   Date Value Ref Range Status   07/11/2024 8 7 - 15 mmol/L Final   07/17/2022 5 3 - 14 mmol/L Final   08/01/2019 5 3 - 14 mmol/L Final     Glucose   Date Value Ref Range Status   07/11/2024 94 70 - 99 mg/dL Final   07/17/2022 88 70 - 99 mg/dL Final   08/01/2019 77 70 - 99 mg/dL Final     Urea Nitrogen   Date Value Ref Range Status   07/11/2024 12.4 6.0 - 20.0 mg/dL Final   07/17/2022 12 7 - 30 mg/dL Final   08/01/2019 11 7 - 30 mg/dL Final     Creatinine   Date Value Ref Range Status   07/11/2024 1.08 (H) 0.51 - 0.95 mg/dL Final   08/01/2019 0.99 0.52 - 1.04 mg/dL Final     GFR Estimate   Date Value Ref Range Status   07/11/2024 64 >60 mL/min/1.73m2 Final     Comment:     eGFR calculated using 2021 CKD-EPI equation.   08/01/2019 71 >60 mL/min/[1.73_m2] Final     Comment:     Non  GFR Calc  Starting 12/18/2018, serum creatinine based estimated GFR (eGFR) will be   calculated using the Chronic Kidney Disease Epidemiology Collaboration   (CKD-EPI) equation.       Calcium   Date Value Ref Range Status   07/11/2024 9.0 8.6 - 10.0 mg/dL Final   08/01/2019 9.1 8.5 - 10.1 mg/dL Final     Bilirubin Total   Date Value Ref Range Status   07/11/2024 0.2 <=1.2 mg/dL Final   08/01/2019 0.2 0.2 - 1.3 mg/dL Final     Alkaline Phosphatase   Date Value Ref Range Status   07/11/2024 79 40 - 150 U/L Final   08/01/2019 75 40 - 150 U/L  Final     ALT   Date Value Ref Range Status   07/11/2024 6 0 - 50 U/L Final     Comment:     Reference intervals for this test were updated on 6/12/2023 to more accurately reflect our healthy population. There may be differences in the flagging of prior results with similar values performed with this method. Interpretation of those prior results can be made in the context of the updated reference intervals.     08/01/2019 13 0 - 50 U/L Final     AST   Date Value Ref Range Status   07/11/2024 13 0 - 45 U/L Final     Comment:     Reference intervals for this test were updated on 6/12/2023 to more accurately reflect our healthy population. There may be differences in the flagging of prior results with similar values performed with this method. Interpretation of those prior results can be made in the context of the updated reference intervals.   08/01/2019 11 0 - 45 U/L Final     Troponin T, High Sensitivity (7/11/24): <6 ng/L (x2)    Serum Qualitative hCG (7/11/24): negative           Signed Electronically by: Kristi Hawkins MD     25.8

## 2024-07-11 NOTE — ED PROVIDER NOTES
"  History     Chief Complaint   Patient presents with    Chest Pain    Headache     Pt states that she noted chest pain moving into her shoulders, neck and head when getting ready for bed tonight     HPI  Rachel Osei is a 44 year old female with PMH notable for migraines  who presents to the ED with chest pain.  Symptoms started at 11 PM as the patient was getting her bed.  She noted pain in the chest, points to anterior generally middle area as location.  Soon after, she noted left-sided headache as well as pain between the shoulders posteriorly.  Since then, headache is resolved.  Pain in the chest and between shoulders remains.  Quality is aching, \"like a sore muscle\".  No shortness of breath, no significant cough, no fever recently.  No history of DVT/PE, no recent immobilization, no exogenous estrogen, pain not worsened with deep breath.    Physical Exam   BP: 127/69  Pulse: 65  Temp: 97.8  F (36.6  C)  Resp: 16  SpO2: 99 %    Physical Exam  General: no acute distress. Appears stated age.   HENT: MMM, no oropharyngeal lesions  Eyes: PERRL, normal sclerae   Neck: No tenderness, full ROM without pain, no meningeal signs  Cardio: Regular rate. Regular rhythm. Extremities well perfused, no leg swelling/edema  Resp: Normal work of breathing, Normal respiratory rate.   Chest: There is mild tenderness of the bilateral trapezius muscles, no anterior chest wall tenderness  Neuro: alert without signs of confusion. CN II-XII grossly intact. Grossly normal strength and sensation in all extremities.   Psych: normal affect, normal behavior      ED Course      Procedures            EKG Interpretation:      Interpreted by Jax Chang MD  Time reviewed: 0022 by this provider, 2334 by Dr. Forrest  Symptoms at time of EKG: chest pain   Rhythm: sinus bradycardia  Rate: 54  Axis: normal  Ectopy: none  Conduction: normal  ST Segments/ T Waves: No ST-T wave changes  Q Waves: none  Comparison to prior: Compared to " 12/24/2019 there is no significant change    Clinical Impression: normal EKG         Labs Ordered and Resulted from Time of ED Arrival to Time of ED Departure   COMPREHENSIVE METABOLIC PANEL - Abnormal       Result Value    Sodium 137      Potassium 3.7      Carbon Dioxide (CO2) 25      Anion Gap 8      Urea Nitrogen 12.4      Creatinine 1.08 (*)     GFR Estimate 64      Calcium 9.0      Chloride 104      Glucose 94      Alkaline Phosphatase 79      AST 13      ALT 6      Protein Total 7.0      Albumin 4.0      Bilirubin Total 0.2     CBC WITH PLATELETS AND DIFFERENTIAL - Abnormal    WBC Count 6.7      RBC Count 3.92      Hemoglobin 10.6 (*)     Hematocrit 32.6 (*)     MCV 83      MCH 27.0      MCHC 32.5      RDW 16.3 (*)     Platelet Count 272      % Neutrophils 52      % Lymphocytes 38      % Monocytes 8      % Eosinophils 2      % Basophils 0      % Immature Granulocytes 0      NRBCs per 100 WBC 0      Absolute Neutrophils 3.5      Absolute Lymphocytes 2.6      Absolute Monocytes 0.5      Absolute Eosinophils 0.1      Absolute Basophils 0.0      Absolute Immature Granulocytes 0.0      Absolute NRBCs 0.0     TROPONIN T, HIGH SENSITIVITY - Normal    Troponin T, High Sensitivity <6     HCG QUALITATIVE PREGNANCY - Normal    hCG Serum Qualitative Negative     TROPONIN T, HIGH SENSITIVITY - Normal    Troponin T, High Sensitivity <6       XR Chest 2 Views   Final Result   IMPRESSION: Negative chest.           Medical Decision Making  The patient's presentation was of high complexity (an acute health issue posing potential threat to life or bodily function).    The patient's evaluation involved:  ordering and/or review of 3+ test(s) in this encounter (see separate area of note for details)  independent interpretation of testing performed by another health professional (chest x-ray)    The patient's management necessitated only low risk treatment.      Assessments & Plan   Patient presenting with pain in the anterior  middle chest and bilateral trapezius areas of about 1 hour duration, brief headache that resolved. Vitals in the ED unremarkable. Nursing notes reviewed.    ECG shows sinus mild bradycardia without evidence of acute ischemia, high-sensitivity troponin negative x 2 - ACS very unlikely. VTE risk factor profile very low, PERC met - PE very unlikely. Character and severity of pain less severe than would be expected for aortic dissection. Lack of ECG findings, lack of troponin elevation, lack of positional component makes pericarditis very unlikely. CXR without evidence of pneumonia, pneumothorax, pleural effusion, nor other visualized pathology.      Headache not thunderclap in onset to suggest aneurysmal SAH.  No head trauma to suggest traumatic intracranial hemorrhage.  No fever nor meningeal signs to suggest meningitis/encephalitis.  No temporal tenderness nor ocular symptoms that would suggest temporal arteritis.    Patient's pain symptoms resolved while in the ED without intervention.    Broad workup for potentially dangerous pathology is reassuring.  After counseling on the diagnosis, work-up, and treatment plan, the patient was discharged. The patient was advised to follow-up with her primary care provider this coming day as already scheduled. The patient was advised to return to the ED if worsening symptoms, or any urgent health concerns.     Final diagnoses:   Chest pain in adult   Spasm of both trapezius muscles     New Prescriptions    No medications on file     --  Jax Chang MD   Emergency Medicine   [facility] EMERGENCY DEPARTMENT  7/10/2024       Jax Chang MD  07/11/24 0332

## 2024-07-11 NOTE — ED TRIAGE NOTES
Pt states that she noted chest pain moving into her shoulders, neck and head when getting ready for bed tonight.      Triage Assessment (Adult)       Row Name 07/10/24 9468          Triage Assessment    Airway WDL WDL        Respiratory WDL    Respiratory WDL WDL        Skin Circulation/Temperature WDL    Skin Circulation/Temperature WDL WDL        Cardiac WDL    Cardiac WDL WDL        Peripheral/Neurovascular WDL    Peripheral Neurovascular WDL WDL        Cognitive/Neuro/Behavioral WDL    Cognitive/Neuro/Behavioral WDL WDL

## 2024-07-12 LAB
C TRACH DNA SPEC QL NAA+PROBE: NEGATIVE
HPV HR 12 DNA CVX QL NAA+PROBE: NEGATIVE
HPV16 DNA CVX QL NAA+PROBE: NEGATIVE
HPV18 DNA CVX QL NAA+PROBE: NEGATIVE
HUMAN PAPILLOMA VIRUS FINAL DIAGNOSIS: NORMAL
N GONORRHOEA DNA SPEC QL NAA+PROBE: NEGATIVE

## 2024-07-13 ASSESSMENT — ENCOUNTER SYMPTOMS
DYSURIA: 1
HEMATURIA: 0
NAUSEA: 0
SHORTNESS OF BREATH: 0
HEADACHES: 0
FEVER: 0
CHILLS: 0
FREQUENCY: 0
DIARRHEA: 0
ACTIVITY CHANGE: 0
DIFFICULTY URINATING: 0
NERVOUS/ANXIOUS: 1

## 2024-07-15 NOTE — PROGRESS NOTES
Preceptor Attestation:   Patient seen, evaluated and discussed with the resident. I have verified the content of the note, which accurately reflects my assessment of the patient and the plan of care.   Supervising Physician:  Bridgette Tapia, DO

## 2024-07-17 LAB
BKR LAB AP GYN ADEQUACY: NORMAL
BKR LAB AP GYN INTERPRETATION: NORMAL
BKR LAB AP PREVIOUS ABNORMAL: NORMAL
PATH REPORT.COMMENTS IMP SPEC: NORMAL
PATH REPORT.COMMENTS IMP SPEC: NORMAL
PATH REPORT.RELEVANT HX SPEC: NORMAL

## 2024-07-18 ENCOUNTER — OFFICE VISIT (OUTPATIENT)
Dept: FAMILY MEDICINE | Facility: CLINIC | Age: 44
End: 2024-07-18
Payer: MEDICAID

## 2024-07-18 ENCOUNTER — ANCILLARY PROCEDURE (OUTPATIENT)
Dept: ULTRASOUND IMAGING | Facility: CLINIC | Age: 44
End: 2024-07-18
Attending: FAMILY MEDICINE
Payer: MEDICAID

## 2024-07-18 VITALS
SYSTOLIC BLOOD PRESSURE: 118 MMHG | WEIGHT: 192.4 LBS | HEART RATE: 70 BPM | OXYGEN SATURATION: 99 % | RESPIRATION RATE: 18 BRPM | HEIGHT: 67 IN | DIASTOLIC BLOOD PRESSURE: 84 MMHG | BODY MASS INDEX: 30.2 KG/M2 | TEMPERATURE: 98 F

## 2024-07-18 DIAGNOSIS — D25.9 UTERINE LEIOMYOMA, UNSPECIFIED LOCATION: ICD-10-CM

## 2024-07-18 DIAGNOSIS — Z12.31 VISIT FOR SCREENING MAMMOGRAM: ICD-10-CM

## 2024-07-18 DIAGNOSIS — Z00.00 ROUTINE GENERAL MEDICAL EXAMINATION AT A HEALTH CARE FACILITY: Primary | ICD-10-CM

## 2024-07-18 DIAGNOSIS — D64.9 ANEMIA, UNSPECIFIED TYPE: ICD-10-CM

## 2024-07-18 DIAGNOSIS — Z12.11 SCREEN FOR COLON CANCER: ICD-10-CM

## 2024-07-18 DIAGNOSIS — L20.89 FLEXURAL ATOPIC DERMATITIS: ICD-10-CM

## 2024-07-18 PROCEDURE — 99396 PREV VISIT EST AGE 40-64: CPT | Mod: GC

## 2024-07-18 PROCEDURE — 76856 US EXAM PELVIC COMPLETE: CPT | Performed by: RADIOLOGY

## 2024-07-18 PROCEDURE — 76830 TRANSVAGINAL US NON-OB: CPT | Performed by: RADIOLOGY

## 2024-07-18 PROCEDURE — 99213 OFFICE O/P EST LOW 20 MIN: CPT | Mod: 25

## 2024-07-18 RX ORDER — TRIAMCINOLONE ACETONIDE 1 MG/G
CREAM TOPICAL 2 TIMES DAILY
Qty: 15 G | Refills: 1 | Status: SHIPPED | OUTPATIENT
Start: 2024-07-18

## 2024-07-18 SDOH — HEALTH STABILITY: PHYSICAL HEALTH: ON AVERAGE, HOW MANY DAYS PER WEEK DO YOU ENGAGE IN MODERATE TO STRENUOUS EXERCISE (LIKE A BRISK WALK)?: 4 DAYS

## 2024-07-18 ASSESSMENT — ENCOUNTER SYMPTOMS
DYSURIA: 0
NERVOUS/ANXIOUS: 1
HEADACHES: 0
BACK PAIN: 1
UNEXPECTED WEIGHT CHANGE: 0
FEVER: 0
FATIGUE: 0

## 2024-07-18 ASSESSMENT — SOCIAL DETERMINANTS OF HEALTH (SDOH): HOW OFTEN DO YOU GET TOGETHER WITH FRIENDS OR RELATIVES?: TWICE A WEEK

## 2024-07-18 NOTE — PROGRESS NOTES
Preventive Care Visit  [facility] DIMITRI Hawkins MD, Family Medicine  Jul 18, 2024      Assessment & Plan     Patient has been advised of split billing requirements and indicates understanding: Yes      1. Routine general medical examination at a health care facility  2. Screen for colon cancer  3. Visit for screening mammogram  Declined vaccinations today. Declined to have blood drawn today for lipid panel due to time constraints but is willing to have this done at the time of her follow up appointment in 1 month.   - Colonoscopy Screening  Referral; Future  - MA Screening Bilateral w/ Brannon; Future    4. Flexural atopic dermatitis  History of eczema. The two scaly erythematous plaques on the flexural creases of the back of the neck appear consistent with atopic dermatitis. Discussed risks and benefits of low- to mid-potency topical steroid cream, including the risk of skin lightening. The patient elected to proceed. Counseled the patient to use 0.1 triamcinolone cream 1-2x daily until 3-5 days after the skin clears or up to two weeks. Recommended the patient apply Vaseline over the cream for additional skin protection. Emphasized the importance of keeping the area well moisturized with emollients once the skin clears.   Scaly erythematous plaque in right antecubital fossa -- contact dermatitis from medical tape vs. atopic dermatitis flare. Patient would like to treat this symptomatically with topical steroid. May consider a referral for formal patch testing in the future.    - triamcinolone (KENALOG) 0.1 % external cream; Apply topically 2 times daily Apply topically once to twice daily until three to five days after symptom resolution or up to two weeks  Dispense: 15 g; Refill: 1  - OFFICE/OUTPT VISIT,CURRY CHO III    5. Uterine leiomyoma, unspecified location  Transvaginal pelvic ultrasound planned for this afternoon. Plan to discuss the results of this study at follow up  "appointment in 1 month.  - OFFICE/OUTPT VISIT,EST,LEVL III    6. Anemia, unspecified type   Suspect iron-deficiency anemia associated with recent change in menstrual cycles. Last menstrual cycle (last week) was more alike to her typical cycles. Briefly reviewed patient's recent CBC results as we discussed at her previous visit. Patient has been more consistent with her iron supplements.    - OFFICE/OUTPT VISIT,ISABEL CHOCARLOS III          BMI  Estimated body mass index is 30.13 kg/m  as calculated from the following:    Height as of this encounter: 1.702 m (5' 7\").    Weight as of this encounter: 87.3 kg (192 lb 6.4 oz).     Counseling  Appropriate preventive services were addressed with this patient via screening, questionnaire, or discussion as appropriate for fall prevention, nutrition, physical activity, Tobacco-use cessation, weight loss and cognition.  Checklist reviewing preventive services available has been given to the patient.  Reviewed patient's diet, addressing concerns and/or questions.   The patient was instructed to see the dentist every 6 months.   She is at risk for psychosocial distress and has been provided with information to reduce risk.     Follow up:  Return in about 4 weeks (around 8/15/2024) for Follow up.        Nathan Ramos is a 44 year old, presenting for the following:  Physical        7/18/2024    10:54 AM   Additional Questions   Roomed by Doua   Accompanied by Son         7/18/2024    10:54 AM   Patient Reported Additional Medications   Patient reports taking the following new medications n/a         7/18/2024    Information    services provided? No           Health Care Directive  Patient does not have a Health Care Directive or Living Will: Discussed advance care planning with patient; however, patient declined at this time.    ALONZO Ramos is a 44 y.o. returning to clinic today for a preventative health care visit. She also has a few concerns she would like to " address today:     Anemia/Uterine Fibroids  She reports that she has been more consistent with iron supplements every other day since her visit last week for her pap smear. She notes that after her pap smear she had her menstrual period which lasted for 5 days. This seemed to be more like her typical menstrual periods and hopes that the issues she was having earlier this year are getting better.  She was scheduled to have transvaginal US this morning but there was a scheduling mix-up so will be returning for the US this afternoon.    Rash/Eczema/Allergy   Reports a rash on the back of her neck that has been present for about 1 week. It is sore and itchy and seems to be getting worse from scratching. She has been putting Vaseline on it. No changes to laundry detergents/fabric softeners/new clothes. She has a history of eczema. She has not tried any topical eczema therapies in the past.    They also have noticed a similar itchy and sore rash that slowly developed in the exact location the tape from her IV was in her right elbow crease. She doesn't recall any allergies to latex or adhesives in the past.        Concerns patient would like to address at a future visit:  Occasional low back pain  Occipital headache with associated neck pain (occurs ~2x/year)  History of migraines                    7/18/2024   General Health   How would you rate your overall physical health? Good   Feel stress (tense, anxious, or unable to sleep) Only a little      (!) STRESS CONCERN      7/18/2024   Nutrition   Three or more servings of calcium each day? (!) NO   Diet: Regular (no restrictions)   How many servings of fruit and vegetables per day? (!) 2-3   How many sweetened beverages each day? 0-1            7/18/2024   Exercise   Days per week of moderate/strenous exercise 4 days            7/18/2024   Social Factors   Frequency of gathering with friends or relatives Twice a week   Worry food won't last until get money to buy more No    Food not last or not have enough money for food? No   Do you have housing? (Housing is defined as stable permanent housing and does not include staying ouside in a car, in a tent, in an abandoned building, in an overnight shelter, or couch-surfing.) No   Are you worried about losing your housing? No   Lack of transportation? No   Unable to get utilities (heat,electricity)? No   Want help with housing or utility concern? No      (!) HOUSING CONCERN PRESENT      7/18/2024   Dental   Dentist two times every year? (!) NO            7/18/2024   TB Screening   Were you born outside of the [place]? No        Today's PHQ-2 Score:       7/11/2024     8:13 AM   PHQ-2 ( 1999 [employer])   Q1: Little interest or pleasure in doing things 0   Q2: Feeling down, depressed or hopeless 0   PHQ-2 Score 0         7/18/2024   Substance Use   Alcohol more than 3/day or more than 7/wk Not Applicable   Do you use any other substances recreationally? No        Social History     Tobacco Use    Smoking status: Never    Smokeless tobacco: Never   Vaping Use    Vaping status: Never Used   Substance Use Topics    Alcohol use: No    Drug use: No        Mammogram Screening - Mammogram every 1-2 years updated in Health Maintenance based on mutual decision making  -no prior history of abnormal mammograms        7/18/2024   STI Screening   New sexual partner(s) since last STI/HIV test? No        History of abnormal Pap smear: No - age 30- 64 PAP with HPV every 5 years recommended        Latest Ref Rng & Units 7/11/2024     8:50 AM 1/5/2010    12:00 AM   PAP / HPV   PAP  Negative for Intraepithelial Lesion or Malignancy (NILM)     PAP (Historical)   NIL    HPV 16 DNA Negative Negative     HPV 18 DNA Negative Negative     Other HR HPV Negative Negative       ASCVD Risk   The 10-year ASCVD risk score (Sugar WANG, et al., 2019) is: 0.6%    Values used to calculate the score:      Age: 44 years      Sex: Female      Is Non- :  Yes      Diabetic: No      Tobacco smoker: No      Systolic Blood Pressure: 118 mmHg      Is BP treated: No      HDL Cholesterol: 68 mg/dL      Total Cholesterol: 217 mg/dL        2024   Contraception/Family Planning   Questions about contraception or family planning No        Patient histories reviewed and updated as needed this visit by Provider   Tobacco  Allergies  Meds   Med Hx  Surg Hx  Fam Hx  Soc Hx Sexual   Activity          Past Medical History:   Diagnosis Date    Anxiety     Eczema     History of uterine fibroid     Intramural, submucous, and subserous leiomyoma of uterus     dx during pregnancy 2019    Migraine without aura and without status migrainosus, not intractable      Past Surgical History:   Procedure Laterality Date    FRENECTOMY      ROOT CANAL       OB History    Para Term  AB Living   2 2 0 0 0 0   SAB IAB Ectopic Multiple Live Births   0 0 0 0 0      # Outcome Date GA Lbr David/2nd Weight Sex Type Anes PTL Lv   2 Para            1 Para              Current Outpatient Medications   Medication Sig Dispense Refill    ferrous sulfate (FEROSUL) 325 (65 Fe) MG tablet Take 1 tablet (325 mg) by mouth every other day 45 tablet 3    triamcinolone (KENALOG) 0.1 % external cream Apply topically 2 times daily Apply topically once to twice daily until three to five days after symptom resolution or up to two weeks 15 g 1     Allergies   Allergen Reactions    Adhesive Tape Dermatitis         Review of Systems   Constitutional:  Negative for fatigue, fever and unexpected weight change.   Cardiovascular:  Negative for chest pain.   Genitourinary:  Negative for dysuria.   Musculoskeletal:  Positive for back pain (occasional).   Skin:  Positive for rash.   Neurological:  Negative for headaches.   Psychiatric/Behavioral:  The patient is nervous/anxious.    All other review of symptoms was otherwise negative except as noted in HPI         Objective    Exam  /84   Pulse 70    "Temp 98  F (36.7  C) (Oral)   Resp 18   Ht 1.702 m (5' 7\")   Wt 87.3 kg (192 lb 6.4 oz)   LMP 07/12/2024 (Within Days)   SpO2 99%   BMI 30.13 kg/m     Estimated body mass index is 30.13 kg/m  as calculated from the following:    Height as of this encounter: 1.702 m (5' 7\").    Weight as of this encounter: 87.3 kg (192 lb 6.4 oz).    Physical Exam  Vitals reviewed.   Constitutional: awake, alert, cooperative, in NAD  Eyes: Sclera without injection, EOM intact   Respiratory: Normal pulmonary effort without coughing or wheezing  Skin: back of neck -- two distinct scaly erythematous plaques with distinct borders and overlying excoriations in the flexural folds of the neck, consistent with atopic dermatitis. Right antecubital fossa -- scaly erythematous plaque with papules isolated to area of the tape that was used to secure an IV; sharply distinct, squared-off borders; no vesicles or bullae noted.   Musculoskeletal: No obvious joint or extremity deformity, edema, or erythema, ROM grossly intact  Neurologic: A&Ox4, without focal deficit, cranial nerves II-XII grossly intact  Psychiatric: Alert & calm with appropriate affect, mood, insight, and thought processes        Signed Electronically by: Kristi Hawkins MD    "

## 2024-07-18 NOTE — PROGRESS NOTES
Preceptor Attestation:  Patient seen and evaluated in person. I discussed the patient with the resident. I have verified the content of the note, which accurately reflects my assessment of the patient and the plan of care.   Supervising Physician:  Yulia Ortega DO

## 2024-07-18 NOTE — PATIENT INSTRUCTIONS
Patient Education   Here is the plan from today's visit    1. Routine general medical examination at a health care facility  2. Screen for colon cancer  3. Visit for screening mammogram   - Colonoscopy Screening  Referral; Future  - MA Screening Bilateral w/ Brannon; Future [you may go to a center of your choice or schedule an appointment with the mammogram bus when it comes to \[facility] [place] [facility]\""]    4. Flexural atopic dermatitis  Use 0.1 triamcinolone cream 1-2x daily until 3-5 days after the area on the back of your neck clears or up to two weeks. Apply Vaseline over the cream for additional skin protection. Keeping the area well moisturized with emollients once the skin clears.   Apply the 0.1 triamcinolone cream to the patch in your the right elbow crease [following the same instructions as above]. Suspect this may be a reaction to the tape used to secure your IV and we may consider a referral to an allergist for formal patch testing in the future.    - triamcinolone (KENALOG) 0.1 % external cream; Apply topically 2 times daily Apply topically once to twice daily until three to five days after symptom resolution or up to two weeks  Dispense: 15 g; Refill: 1      5. Uterine leiomyoma, unspecified location  Transvaginal pelvic ultrasound planned for this afternoon. Plan to discuss the results of this study at follow up appointment in 1 month.    6. Anemia, unspecified type   Continue to take your iron supplement every other day            Please call or return to clinic if your symptoms don't go away.    Follow up plan  Return in about 4 weeks (around 8/15/2024) for Follow up.    Thank you for coming to [facility] today.  Lab Testing:  **If you had lab testing today and your results are reassuring or normal they will be mailed to you or sent through [employer] within 7 days.   **If the lab tests need quick action we will call you with the results.  **If you are having labs done on a different day, please call  [phone] to schedule at [facility] or [phone] for other [facility] locations. Labs do not offer walk-in appointments.  The phone number we will call with results is # [phone] (home) . If this is not the best number please call our clinic and change the number.  Medication Refills:  If you need any refills please call your pharmacy and they will contact us.   If you need to  your refill at a new pharmacy, please contact the new pharmacy directly. The new pharmacy will help you get your medications transferred faster.   Scheduling:  If you have any concerns about today's visit or wish to schedule another appointment please call our office during normal business hours [phone] (8-5:00 M-F). If you can no longer make a scheduled visit, please cancel via [employer] or call us to cancel.   If a referral was made to an [facility] specialty provider and you do not get a call from central scheduling, please refer to directions on your visit summary or call our office during normal business hours for assistance.   If a Mammogram was ordered for you at the [facility] call [phone] to schedule or change your appointment.  If you had an XRay/CT/Ultrasound/MRI ordered the number is [phone] to schedule or change your radiology appointment.   [facility] has limited ultrasound appointments available on Wednesdays, if you would like your ultrasound at [facility], please call [phone] to schedule.   Medical Concerns:  If you have urgent medical concerns please call [phone] at any time of the day.    Kristi Hawkins MD            Patient Education   Eating Healthy Foods: Care Instructions  With every meal, you can make healthy food choices. Try to eat a variety of fruits, vegetables, whole grains, lean proteins, and low-fat dairy products. This can help you get the right balance of nutrients, including vitamins and minerals. Small changes add up over  "time. You can start by adding one healthy food to your meals each day.    Try to make half your plate fruits and vegetables, one-fourth whole grains, and one-fourth lean proteins. Try including dairy with your meals.   Eat more fruits and vegetables. Try to have them with most meals and snacks.   Foods for healthy eating    Fruits    These can be fresh, frozen, canned, or dried.  Try to choose whole fruit rather than fruit juice.  Eat a variety of colors.    Vegetables    These can be fresh, frozen, canned, or dried.  Beans, peas, and lentils count too.    Whole grains    Choose whole-grain breads, cereals, and noodles.  Try brown rice.    Lean proteins    These can include lean meat, poultry, fish, and eggs.  You can also have tofu, beans, peas, lentils, nuts, and seeds.    Dairy    Try milk, yogurt, and cheese.  Choose low-fat or fat-free when you can.  If you need to, use lactose-free milk or fortified plant-based milk products, such as soy milk.    Water    Drink water when you're thirsty.  Limit sugar-sweetened drinks, including soda, fruit drinks, and sports drinks.  Where can you learn more?  Go to https://www.[employer].net/patiented  Enter T756 in the search box to learn more about \"Eating Healthy Foods: Care Instructions.\"  Current as of: September 20, 2023               Content Version: 14.0    3792-5457 [employer].   Care instructions adapted under license by your healthcare professional. If you have questions about a medical condition or this instruction, always ask your healthcare professional. [employer] disclaims any warranty or liability for your use of this information.      Preventive Care Advice   This is general advice given by our system to help you stay healthy. However, your care team may have specific advice just for you. Please talk to your care team about your preventive care needs.  Nutrition  Eat 5 or more servings of fruits and vegetables each day.  Try wheat " bread, brown rice and whole grain pasta (instead of white bread, rice, and pasta).  Get enough calcium and vitamin D. Check the label on foods and aim for 100% of the RDA (recommended daily allowance).  Lifestyle  Exercise at least 150 minutes each week  (30 minutes a day, 5 days a week).  Do muscle strengthening activities 2 days a week. These help control your weight and prevent disease.  No smoking.  Wear sunscreen to prevent skin cancer.  Have a dental exam and cleaning every 6 months.  Yearly exams  See your health care team every year to talk about:  Any changes in your health.  Any medicines your care team has prescribed.  Preventive care, family planning, and ways to prevent chronic diseases.  Shots (vaccines)   HPV shots (up to age 26), if you've never had them before.  Hepatitis B shots (up to age 59), if you've never had them before.  COVID-19 shot: Get this shot when it's due.  Flu shot: Get a flu shot every year.  Tetanus shot: Get a tetanus shot every 10 years.  Pneumococcal, hepatitis A, and RSV shots: Ask your care team if you need these based on your risk.  Shingles shot (for age 50 and up)  General health tests  Diabetes screening:  Starting at age 35, Get screened for diabetes at least every 3 years.  If you are younger than age 35, ask your care team if you should be screened for diabetes.  Cholesterol test: At age 39, start having a cholesterol test every 5 years, or more often if advised.  Bone density scan (DEXA): At age 50, ask your care team if you should have this scan for osteoporosis (brittle bones).  Hepatitis C: Get tested at least once in your life.  STIs (sexually transmitted infections)  Before age 24: Ask your care team if you should be screened for STIs.  After age 24: Get screened for STIs if you're at risk. You are at risk for STIs (including HIV) if:  You are sexually active with more than one person.  You don't use condoms every time.  You or a partner was diagnosed with a  sexually transmitted infection.  If you are at risk for HIV, ask about PrEP medicine to prevent HIV.  Get tested for HIV at least once in your life, whether you are at risk for HIV or not.  Cancer screening tests  Cervical cancer screening: If you have a cervix, begin getting regular cervical cancer screening tests starting at age 21.  Breast cancer scan (mammogram): If you've ever had breasts, begin having regular mammograms starting at age 40. This is a scan to check for breast cancer.  Colon cancer screening: It is important to start screening for colon cancer at age 45.  Have a colonoscopy test every 10 years (or more often if you're at risk) Or, ask your provider about stool tests like a FIT test every year or Cologuard test every 3 years.  To learn more about your testing options, visit:   .  For help making a decision, visit:   https://bit.ly/gi47700.  Prostate cancer screening test: If you have a prostate, ask your care team if a prostate cancer screening test (PSA) at age 55 is right for you.  Lung cancer screening: If you are a current or former smoker ages 50 to 80, ask your care team if ongoing lung cancer screenings are right for you.  For informational purposes only. Not to replace the advice of your health care provider. Copyright   2023 [facility]. All rights reserved. Clinically reviewed by the [facility] Transitions Program. [employer] 068334 - REV 01/24.  Eating Healthy Foods: Care Instructions  With every meal, you can make healthy food choices. Try to eat a variety of fruits, vegetables, whole grains, lean proteins, and low-fat dairy products. This can help you get the right balance of nutrients, including vitamins and minerals. Small changes add up over time. You can start by adding one healthy food to your meals each day.    Try to make half your plate fruits and vegetables, one-fourth whole grains, and one-fourth lean proteins. Try including dairy with your meals.   Eat  "more fruits and vegetables. Try to have them with most meals and snacks.   Foods for healthy eating    Fruits    These can be fresh, frozen, canned, or dried.  Try to choose whole fruit rather than fruit juice.  Eat a variety of colors.    Vegetables    These can be fresh, frozen, canned, or dried.  Beans, peas, and lentils count too.    Whole grains    Choose whole-grain breads, cereals, and noodles.  Try brown rice.    Lean proteins    These can include lean meat, poultry, fish, and eggs.  You can also have tofu, beans, peas, lentils, nuts, and seeds.    Dairy    Try milk, yogurt, and cheese.  Choose low-fat or fat-free when you can.  If you need to, use lactose-free milk or fortified plant-based milk products, such as soy milk.    Water    Drink water when you're thirsty.  Limit sugar-sweetened drinks, including soda, fruit drinks, and sports drinks.  Where can you learn more?  Go to https://www.[employer].net/patiented  Enter T756 in the search box to learn more about \"Eating Healthy Foods: Care Instructions.\"  Current as of: September 20, 2023               Content Version: 14.0    1041-7564 [employer].   Care instructions adapted under license by your healthcare professional. If you have questions about a medical condition or this instruction, always ask your healthcare professional. [employer], [employer] disclaims any warranty or liability for your use of this information.         "

## 2024-07-30 ENCOUNTER — OFFICE VISIT (OUTPATIENT)
Dept: FAMILY MEDICINE | Facility: CLINIC | Age: 44
End: 2024-07-30
Payer: MEDICAID

## 2024-07-30 ENCOUNTER — ANCILLARY PROCEDURE (OUTPATIENT)
Dept: GENERAL RADIOLOGY | Facility: CLINIC | Age: 44
End: 2024-07-30
Attending: FAMILY MEDICINE
Payer: MEDICAID

## 2024-07-30 VITALS
OXYGEN SATURATION: 100 % | HEIGHT: 67 IN | RESPIRATION RATE: 12 BRPM | WEIGHT: 191.7 LBS | DIASTOLIC BLOOD PRESSURE: 76 MMHG | HEART RATE: 64 BPM | BODY MASS INDEX: 30.09 KG/M2 | SYSTOLIC BLOOD PRESSURE: 114 MMHG | TEMPERATURE: 98.3 F

## 2024-07-30 DIAGNOSIS — G89.29 CHRONIC PAIN OF RIGHT KNEE: ICD-10-CM

## 2024-07-30 DIAGNOSIS — R09.A2 FOREIGN BODY SENSATION IN THROAT: ICD-10-CM

## 2024-07-30 DIAGNOSIS — D25.9 UTERINE LEIOMYOMA, UNSPECIFIED LOCATION: ICD-10-CM

## 2024-07-30 DIAGNOSIS — M25.561 CHRONIC PAIN OF RIGHT KNEE: ICD-10-CM

## 2024-07-30 DIAGNOSIS — G89.29 CHRONIC PAIN OF RIGHT KNEE: Primary | ICD-10-CM

## 2024-07-30 DIAGNOSIS — M25.561 CHRONIC PAIN OF RIGHT KNEE: Primary | ICD-10-CM

## 2024-07-30 PROCEDURE — 73562 X-RAY EXAM OF KNEE 3: CPT | Mod: RT | Performed by: RADIOLOGY

## 2024-07-30 PROCEDURE — 99214 OFFICE O/P EST MOD 30 MIN: CPT | Performed by: FAMILY MEDICINE

## 2024-07-30 NOTE — PROGRESS NOTES
"  Assessment & Plan     Chronic pain of right knee  Given the chronic nature of the pain and lack of any source of trauma, I suspect this is likely osteoarthritis vs patellofemoral syndrome. I think it would be reasonable to get an x-ray to rule out any bony abnormalities. If this returns normal, we discussed a PT referral, and the patient would be interested in seeing PT. We discussed Tylenol, Ibuprofen, and ice for pain management.    - Ibuprofen, Tylenol, ice as needed for pain  - XR Knee Right 3 Views; Future  - PT referral if x-ray unremarkable    Uterine leiomyoma, unspecified location   We discussed the risks and benefits of potential treatment options, including close monitoring, OCPs, and surgery. Through discussion, we agreed that with miimnal symptoms, the best course of action for now will be to monitor without treatment. If patient develops new or worsening symptoms, we can re-evaluate treatment options at that time. Patient is comfortable with this paln.    Foreign body sensation in throat  With improving symptoms and no difficulty swallowing, I suspect this could be acid reflux. Differential could also include foreign body in throat though I feel this is less likely with no difficulties swallowing or speaking. Chronic nature of symptoms also favors acid reflux at this time. I think it is reasonable to trail omeprazole to manage symptoms. Today, patient denies any symptoms. We discussed filling the prescription only if symptoms return. If symptoms return and omeprazole does not improve or resolve the sensation, we can re-evaluate here in the clinic to discuss ENT vs GI referral at that time.     - omeprazole (PRILOSEC) 20 MG DR capsule; Take 1 capsule (20 mg) by mouth daily    BMI  Estimated body mass index is 30.02 kg/m  as calculated from the following:    Height as of this encounter: 1.702 m (5' 7\").    Weight as of this encounter: 87 kg (191 lb 11.2 oz).   Weight management plan: discussed weight " "fluctuations with patient      See Patient Instructions    No follow-ups on file.    Nathan Ramos is a 44 year old, presenting for the following health issues:  Results (imaging)      7/30/2024     1:44 PM   Additional Questions   Roomed by tre   Accompanied by self         7/30/2024    Information    services provided? No        HPI       On 7/18 (12 days ago), patient had an ultrasound that showed persistent fibroids. She presents today to discuss this imaging result and potential treatment options. She reports having a known fibroid during her last pregnancy. For symptoms, she reports intermittent mild abdominal cramping in the bilateral lower quadrants and notes one episode of 10 days of menstrual bleeding 4-6 months ago. No heavy menstrual flow.     She also reports intermittent right knee swelling and pain. The pain can last for weeks at a time. She reports difficulty walking and taking the stairs due to the pain. She says the pain worsens with weight gain. No warmth. She has never had an imaging done of this knee.    Patient also mentions foreign body sensation in her throat since swallowing a gnat in early July saying \"it feels like something is stuck.\" She occasionally feels the sensation exclusively with swallowing both solids and liquids. Denies any pain. She endorses some nausea with this sensation.Patient reports that this sensation has been improving over the past 3-4 days.      Review of Systems  Constitutional, HEENT, cardiovascular, pulmonary, gi and gu systems are negative, except as otherwise noted.      Objective    /76 (BP Location: Left arm, Patient Position: Sitting, Cuff Size: Adult Regular)   Pulse 64   Temp 98.3  F (36.8  C) (Oral)   Resp 12   Ht 1.702 m (5' 7\")   Wt 87 kg (191 lb 11.2 oz)   LMP 07/12/2024 (Within Days)   SpO2 100%   BMI 30.02 kg/m    Body mass index is 30.02 kg/m .  Physical Exam   Constitutional: Appears comfortable. No acute " distress.  Head: Normocephalic. Atraumatic.   Eyes: EOMI. No scleral icterus.  ENT: Oropharynx clear. Mucous membranes moist. No foreign body appreciated in oropharynx.  CV: Regular rate and rhythm. No murmurs, rubs, or gallops. No bilateral lower extremity edema.  RESP: Lungs clear to auscultation bilaterally. No wheezes, rales, or rhonci. Normal effort.  GI: No distension.   MSK: Extremities non-tender to palpation. Mild right knee swelling. No warmth or erythema. R knee: No ligamentous laxity, negative thessaly.   SKIN: No jaundice of extremities. No rash appreciated.   NEURO: A&O x3.   PSYCH: Normal affect. Good eye contact.       No results found for this or any previous visit (from the past 24 hour(s)).        Davida Tian  Medical Student, MS3    I was present with the medical student who participated in the service and in the documentation of this note. I have verified the history and personally performed the physical exam and medical decision making, and have verified the content of the note, which accurately reflects my assessment of the patient and the plan of care.   Berenice Arndt DO      Signed Electronically by: Berenice Arndt DO

## 2024-07-30 NOTE — PATIENT INSTRUCTIONS
[facility] health: [phone]   Mammogram [phone]    1) Fibroids:   We don't need to do any treatments unless you develop painful periods, pelvic pain or heavy bleeding. Let us know if that happens.     2) Throat pain:   Try omeprazole for 2 weeks if symptoms come back.   If omeprazole doesn't help; return for a follow-up visit.     3) Knee pain:   - We will get an x-ray today   - Can use Ice, tylenol, ibuprofen for pain as needed   - Consider doing physical therapy to help with the pain longer term    Berenice Arndt, DO

## 2024-08-02 ENCOUNTER — THERAPY VISIT (OUTPATIENT)
Dept: PHYSICAL THERAPY | Facility: CLINIC | Age: 44
End: 2024-08-02
Attending: FAMILY MEDICINE
Payer: MEDICAID

## 2024-08-02 DIAGNOSIS — M25.561 CHRONIC PAIN OF RIGHT KNEE: ICD-10-CM

## 2024-08-02 DIAGNOSIS — G89.29 CHRONIC PAIN OF RIGHT KNEE: ICD-10-CM

## 2024-08-02 PROCEDURE — 97161 PT EVAL LOW COMPLEX 20 MIN: CPT | Mod: GP

## 2024-08-02 PROCEDURE — 97110 THERAPEUTIC EXERCISES: CPT | Mod: GP

## 2024-08-02 ASSESSMENT — ACTIVITIES OF DAILY LIVING (ADL)
SQUAT: ACTIVITY IS VERY DIFFICULT
RISE FROM A CHAIR: ACTIVITY IS NOT DIFFICULT
RISE FROM A CHAIR: ACTIVITY IS NOT DIFFICULT
GIVING WAY, BUCKLING OR SHIFTING OF KNEE: THE SYMPTOM AFFECTS MY ACTIVITY SLIGHTLY
SIT WITH YOUR KNEE BENT: ACTIVITY IS NOT DIFFICULT
KNEE_ACTIVITY_OF_DAILY_LIVING_SCORE: 70
KNEEL ON THE FRONT OF YOUR KNEE: ACTIVITY IS MINIMALLY DIFFICULT
KNEEL ON THE FRONT OF YOUR KNEE: ACTIVITY IS MINIMALLY DIFFICULT
GO DOWN STAIRS: ACTIVITY IS SOMEWHAT DIFFICULT
HOW_WOULD_YOU_RATE_THE_OVERALL_FUNCTION_OF_YOUR_KNEE_DURING_YOUR_USUAL_DAILY_ACTIVITIES?: ABNORMAL
PLEASE_INDICATE_YOR_PRIMARY_REASON_FOR_REFERRAL_TO_THERAPY:: KNEE
LIMPING: THE SYMPTOM AFFECTS MY ACTIVITY SLIGHTLY
PAIN: THE SYMPTOM AFFECTS MY ACTIVITY SLIGHTLY
GIVING WAY, BUCKLING OR SHIFTING OF KNEE: THE SYMPTOM AFFECTS MY ACTIVITY SLIGHTLY
STIFFNESS: I DO NOT HAVE THE SYMPTOM
GO UP STAIRS: ACTIVITY IS FAIRLY DIFFICULT
SIT WITH YOUR KNEE BENT: ACTIVITY IS NOT DIFFICULT
SQUAT: ACTIVITY IS VERY DIFFICULT
WALK: ACTIVITY IS MINIMALLY DIFFICULT
WALK: ACTIVITY IS MINIMALLY DIFFICULT
GO UP STAIRS: ACTIVITY IS FAIRLY DIFFICULT
SWELLING: THE SYMPTOM AFFECTS MY ACTIVITY MODERATELY
LIMPING: THE SYMPTOM AFFECTS MY ACTIVITY SLIGHTLY
STAND: ACTIVITY IS MINIMALLY DIFFICULT
HOW_WOULD_YOU_RATE_THE_OVERALL_FUNCTION_OF_YOUR_KNEE_DURING_YOUR_USUAL_DAILY_ACTIVITIES?: ABNORMAL
GO DOWN STAIRS: ACTIVITY IS SOMEWHAT DIFFICULT
STAND: ACTIVITY IS MINIMALLY DIFFICULT
RAW_SCORE: 49
WEAKNESS: I DO NOT HAVE THE SYMPTOM
STIFFNESS: I DO NOT HAVE THE SYMPTOM
AS_A_RESULT_OF_YOUR_KNEE_INJURY,_HOW_WOULD_YOU_RATE_YOUR_CURRENT_LEVEL_OF_DAILY_ACTIVITY?: ABNORMAL
WEAKNESS: I DO NOT HAVE THE SYMPTOM
AS_A_RESULT_OF_YOUR_KNEE_INJURY,_HOW_WOULD_YOU_RATE_YOUR_CURRENT_LEVEL_OF_DAILY_ACTIVITY?: ABNORMAL
PAIN: THE SYMPTOM AFFECTS MY ACTIVITY SLIGHTLY
SWELLING: THE SYMPTOM AFFECTS MY ACTIVITY MODERATELY
KNEE_ACTIVITY_OF_DAILY_LIVING_SUM: 49
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
## 2024-08-04 PROBLEM — G89.29 CHRONIC PAIN OF RIGHT KNEE: Status: ACTIVE | Noted: 2024-08-04

## 2024-08-04 PROBLEM — M25.561 CHRONIC PAIN OF RIGHT KNEE: Status: ACTIVE | Noted: 2024-08-04

## 2024-08-05 ENCOUNTER — THERAPY VISIT (OUTPATIENT)
Dept: PHYSICAL THERAPY | Facility: CLINIC | Age: 44
End: 2024-08-05
Payer: MEDICAID

## 2024-08-05 DIAGNOSIS — M25.561 CHRONIC PAIN OF RIGHT KNEE: Primary | ICD-10-CM

## 2024-08-05 DIAGNOSIS — G89.29 CHRONIC PAIN OF RIGHT KNEE: Primary | ICD-10-CM

## 2024-08-05 PROCEDURE — 97530 THERAPEUTIC ACTIVITIES: CPT | Mod: GP

## 2024-08-05 PROCEDURE — 97110 THERAPEUTIC EXERCISES: CPT | Mod: GP

## 2025-04-13 ENCOUNTER — HEALTH MAINTENANCE LETTER (OUTPATIENT)
Age: 45
End: 2025-04-13

## 2025-06-18 ENCOUNTER — PATIENT OUTREACH (OUTPATIENT)
Dept: CARE COORDINATION | Facility: CLINIC | Age: 45
End: 2025-06-18
Payer: MEDICAID

## 2025-07-02 ENCOUNTER — PATIENT OUTREACH (OUTPATIENT)
Dept: CARE COORDINATION | Facility: CLINIC | Age: 45
End: 2025-07-02
Payer: MEDICAID

## 2025-07-28 ENCOUNTER — OFFICE VISIT (OUTPATIENT)
Dept: FAMILY MEDICINE | Facility: CLINIC | Age: 45
End: 2025-07-28

## 2025-07-28 VITALS
HEIGHT: 67 IN | BODY MASS INDEX: 31.71 KG/M2 | HEART RATE: 73 BPM | RESPIRATION RATE: 14 BRPM | SYSTOLIC BLOOD PRESSURE: 114 MMHG | OXYGEN SATURATION: 99 % | TEMPERATURE: 97.8 F | DIASTOLIC BLOOD PRESSURE: 80 MMHG | WEIGHT: 202 LBS

## 2025-07-28 DIAGNOSIS — Z12.11 SCREEN FOR COLON CANCER: ICD-10-CM

## 2025-07-28 DIAGNOSIS — Z00.00 HEALTHCARE MAINTENANCE: Primary | ICD-10-CM

## 2025-07-28 DIAGNOSIS — Z11.3 ROUTINE SCREENING FOR STI (SEXUALLY TRANSMITTED INFECTION): ICD-10-CM

## 2025-07-28 DIAGNOSIS — Z12.31 VISIT FOR SCREENING MAMMOGRAM: ICD-10-CM

## 2025-07-28 DIAGNOSIS — Z13.1 SCREENING FOR DIABETES MELLITUS: ICD-10-CM

## 2025-07-28 DIAGNOSIS — Z11.59 ENCOUNTER FOR HEPATITIS C SCREENING TEST FOR LOW RISK PATIENT: ICD-10-CM

## 2025-07-28 LAB
ALBUMIN SERPL BCG-MCNC: 4.2 G/DL (ref 3.5–5.2)
ALP SERPL-CCNC: 88 U/L (ref 40–150)
ALT SERPL W P-5'-P-CCNC: 5 U/L (ref 0–50)
ANION GAP SERPL CALCULATED.3IONS-SCNC: 8 MMOL/L (ref 7–15)
AST SERPL W P-5'-P-CCNC: 17 U/L (ref 0–45)
BILIRUB SERPL-MCNC: 0.2 MG/DL
BUN SERPL-MCNC: 16.2 MG/DL (ref 6–20)
CALCIUM SERPL-MCNC: 9.8 MG/DL (ref 8.8–10.4)
CHLORIDE SERPL-SCNC: 105 MMOL/L (ref 98–107)
CHOLEST SERPL-MCNC: 218 MG/DL
CREAT SERPL-MCNC: 1.08 MG/DL (ref 0.51–0.95)
EGFRCR SERPLBLD CKD-EPI 2021: 63 ML/MIN/1.73M2
ERYTHROCYTE [DISTWIDTH] IN BLOOD BY AUTOMATED COUNT: 14.2 % (ref 10–15)
EST. AVERAGE GLUCOSE BLD GHB EST-MCNC: 108 MG/DL
FASTING STATUS PATIENT QL REPORTED: YES
FASTING STATUS PATIENT QL REPORTED: YES
GLUCOSE SERPL-MCNC: 84 MG/DL (ref 70–99)
HBA1C MFR BLD: 5.4 % (ref 0–5.6)
HCO3 SERPL-SCNC: 25 MMOL/L (ref 22–29)
HCT VFR BLD AUTO: 36.7 % (ref 35–47)
HCV AB SERPL QL IA: NONREACTIVE
HDLC SERPL-MCNC: 60 MG/DL
HGB BLD-MCNC: 11.8 G/DL (ref 11.7–15.7)
HIV 1+2 AB+HIV1 P24 AG SERPL QL IA: NONREACTIVE
LDLC SERPL CALC-MCNC: 142 MG/DL
MCH RBC QN AUTO: 28.1 PG (ref 26.5–33)
MCHC RBC AUTO-ENTMCNC: 32.2 G/DL (ref 31.5–36.5)
MCV RBC AUTO: 87 FL (ref 78–100)
NONHDLC SERPL-MCNC: 158 MG/DL
PLATELET # BLD AUTO: 264 10E3/UL (ref 150–450)
POTASSIUM SERPL-SCNC: 4.5 MMOL/L (ref 3.4–5.3)
PROT SERPL-MCNC: 7.8 G/DL (ref 6.4–8.3)
RBC # BLD AUTO: 4.2 10E6/UL (ref 3.8–5.2)
SODIUM SERPL-SCNC: 138 MMOL/L (ref 135–145)
T PALLIDUM AB SER QL: NONREACTIVE
TRIGL SERPL-MCNC: 79 MG/DL
WBC # BLD AUTO: 7.2 10E3/UL (ref 4–11)

## 2025-07-28 PROCEDURE — 80061 LIPID PANEL: CPT

## 2025-07-28 PROCEDURE — 86780 TREPONEMA PALLIDUM: CPT

## 2025-07-28 PROCEDURE — 99213 OFFICE O/P EST LOW 20 MIN: CPT | Mod: GC

## 2025-07-28 PROCEDURE — 87591 N.GONORRHOEAE DNA AMP PROB: CPT

## 2025-07-28 PROCEDURE — 83036 HEMOGLOBIN GLYCOSYLATED A1C: CPT

## 2025-07-28 PROCEDURE — 87491 CHLMYD TRACH DNA AMP PROBE: CPT

## 2025-07-28 PROCEDURE — 85027 COMPLETE CBC AUTOMATED: CPT

## 2025-07-28 PROCEDURE — 86803 HEPATITIS C AB TEST: CPT

## 2025-07-28 PROCEDURE — 36415 COLL VENOUS BLD VENIPUNCTURE: CPT

## 2025-07-28 PROCEDURE — 80053 COMPREHEN METABOLIC PANEL: CPT

## 2025-07-28 PROCEDURE — 87389 HIV-1 AG W/HIV-1&-2 AB AG IA: CPT

## 2025-07-29 ENCOUNTER — PATIENT OUTREACH (OUTPATIENT)
Dept: CARE COORDINATION | Facility: CLINIC | Age: 45
End: 2025-07-29

## 2025-07-29 LAB
C TRACH DNA SPEC QL PROBE+SIG AMP: NEGATIVE
N GONORRHOEA DNA SPEC QL NAA+PROBE: NEGATIVE
SPECIMEN TYPE: NORMAL

## 2025-07-30 ENCOUNTER — HOSPITAL ENCOUNTER (OUTPATIENT)
Facility: AMBULATORY SURGERY CENTER | Age: 45
End: 2025-07-30
Attending: SURGERY | Admitting: SURGERY

## 2025-07-30 ENCOUNTER — TELEPHONE (OUTPATIENT)
Dept: GASTROENTEROLOGY | Facility: CLINIC | Age: 45
End: 2025-07-30

## 2025-07-30 ENCOUNTER — ANCILLARY PROCEDURE (OUTPATIENT)
Dept: MAMMOGRAPHY | Facility: CLINIC | Age: 45
End: 2025-07-30
Attending: FAMILY MEDICINE

## 2025-07-30 DIAGNOSIS — Z12.31 VISIT FOR SCREENING MAMMOGRAM: ICD-10-CM

## 2025-07-30 PROCEDURE — 77063 BREAST TOMOSYNTHESIS BI: CPT

## 2025-07-30 PROCEDURE — 77063 BREAST TOMOSYNTHESIS BI: CPT | Mod: 26

## 2025-07-30 PROCEDURE — 77067 SCR MAMMO BI INCL CAD: CPT | Mod: 26

## 2025-07-30 NOTE — TELEPHONE ENCOUNTER
Pre visit planning completed.      Procedure details:    Patient scheduled for Colonoscopy on 8/8/25.     Arrival time: 0940. Procedure time 1040    Facility location: [facility]; [address] [place] [address], [address] Floor, [place] [address]. Check in location: 5th Floor.    Sedation type: Conscious sedation     Pre op exam needed? No.    Indication for procedure: screening colonoscopy       Chart review:     Electronic implanted devices? No    Recent diagnosis of diverticulitis within the last 6 weeks? No      Medication review:    Diabetic? No    Anticoagulants? No    Weight loss medication/injectable? No GLP-1 medication per patient's medication list. Nursing to verify with pre-assessment call.    Other medication HOLDING recommendations:  N/A      Prep for procedure:     Bowel prep recommendation: Standard Miralax.   Due to: standard bowel prep    Procedure information and instructions sent via [employer]         Jenny Dubon RN  Endoscopy Procedure Pre Assessment   [phone] option 3

## 2025-07-30 NOTE — TELEPHONE ENCOUNTER
"Endoscopy Scheduling Screen    Caller: patient    Have you had any respiratory illness or flu-like symptoms in the last 10 days?  No    Patient is ACTIVE on [employer].  Inform patient that all appointment instructions will be sent via [employer].    Review patient's insurance for any non participating payor.    Ordering/Referring Provider:   RIA TANNER         (If ordering provider performs procedure, schedule with ordering provider unless otherwise instructed. )    BMI: Estimated body mass index is 31.64 kg/m  as calculated from the following:    Height as of 7/28/25: 1.702 m (5' 7\").    Weight as of 7/28/25: 91.6 kg (202 lb).     Sedation Ordered  moderate sedation.   If patient BMI > 50 do not schedule in [facility].    If patient BMI > 45 do not schedule at [facility].    Are you taking methadone or Suboxone?  NO, No RN review required.    Have you been diagnosed and are being treated for severe PTSD or severe anxiety?  NO, No RN review required.    Are you taking any prescription medications for pain 3 or more times per week?   NO, No RN review required.    Do you have a history of malignant hyperthermia?  No    (Females) Are you currently pregnant?   No     Have you been diagnosed or told you have pulmonary hypertension?   No    Do you have an LVAD?  No    Have you been told you have moderate to severe sleep apnea?  No.    Have you been told you have COPD, asthma, or any other lung disease?  No    Has your doctor ordered any cardiac tests like echo, angiogram, stress test, ablation, or EKG, that you have not completed yet?  No    Do you  have a history of any heart conditions?  No     Have you ever had or are you waiting for an organ transplant?  No. Continue scheduling, no site restrictions.    Have you had a stroke or transient ischemic attack (TIA aka \"mini stroke\") in the last 2 years?   No.    Have you been diagnosed with or been told you have cirrhosis of the liver?   No.    Are you currently on dialysis?   No    Do " "you need assistance transferring?   No    BMI: Estimated body mass index is 31.64 kg/m  as calculated from the following:    Height as of 7/28/25: 1.702 m (5' 7\").    Weight as of 7/28/25: 91.6 kg (202 lb).     Is patients BMI > 40 and scheduling location [facility]?  No    Do you take an injectable or oral medication for weight loss or diabetes (excluding insulin)?  No    Do you take the medication Naltrexone?  No    Do you take blood thinners?  No       Prep   Are you currently on dialysis or do you have chronic kidney disease?  No    Do you have a diagnosis of diabetes?  No    Do you have a diagnosis of cystic fibrosis (CF)?  No    On a regular basis do you go 3 -5 days between bowel movements?  No    BMI > 40?  No    Preferred Pharmacy:    [id number] [address] [address]  Phone: [phone] Fax: [phone]    Final Scheduling Details     Procedure scheduled  Colonoscopy    Surgeon:  KASSIDY     Date of procedure:  08/08/2025     Pre-OP / PAC:   No - Not required for this site.    Location  [facility] - [facility] - Patient preference.    Sedation   Moderate Sedation - Per order.    PT EXPLAINED SHE HAS MEDICAID. THE INFORMATION THE PT PROVIDED DID NOT ALLOW ME TO ADD MEDICAID TO THEIR CHART. PT WILL VERIFY INFO AND WILL CALL BACK TO ADD INSURANCE.      Patient Reminders:   You will receive a call from a Nurse to review instructions and health history.  This assessment must be completed prior to your procedure.  Failure to complete the Nurse assessment may result in the procedure being cancelled.      On the day of your procedure, please designate an adult(s) who can drive you home stay with you for the next 24 hours. The medicines used in the exam will make you sleepy. You will not be able to drive.      You cannot take public transportation, ride share services, or non-medical taxi service without a responsible caregiver.  Medical " transport services are allowed with the requirement that a responsible caregiver will receive you at your destination.  We require that drivers and caregivers are confirmed prior to your procedure.

## 2025-07-30 NOTE — TELEPHONE ENCOUNTER
Pre assessment completed for upcoming procedure.   (Please see previous telephone encounter notes for complete details)    Procedure details:    Procedure date 8/8/25, arrival time 0940 and facility location reviewed.    Pre op exam needed? No.    Designated  policy reviewed. Instructed to have someone stay 6  hours post procedure.     Medication review:    Medications reviewed. Please see supporting documentation below. Holding recommendations discussed (if applicable).     Prep for procedure:     Procedure prep instructions reviewed.      Any additional information needed:  N/A    Patient verbalized understanding and had no questions or concerns at this time.      Elizabet Barrios LPN  Endoscopy Procedure Pre Assessment   [phone] option 3

## 2025-08-03 ENCOUNTER — RESULTS FOLLOW-UP (OUTPATIENT)
Dept: FAMILY MEDICINE | Facility: CLINIC | Age: 45
End: 2025-08-03

## 2025-08-07 ENCOUNTER — TELEPHONE (OUTPATIENT)
Dept: GASTROENTEROLOGY | Facility: CLINIC | Age: 45
End: 2025-08-07